# Patient Record
Sex: MALE | Race: BLACK OR AFRICAN AMERICAN | NOT HISPANIC OR LATINO | URBAN - METROPOLITAN AREA
[De-identification: names, ages, dates, MRNs, and addresses within clinical notes are randomized per-mention and may not be internally consistent; named-entity substitution may affect disease eponyms.]

---

## 2017-03-10 ENCOUNTER — OUTPATIENT (OUTPATIENT)
Dept: OUTPATIENT SERVICES | Facility: HOSPITAL | Age: 47
LOS: 1 days | Discharge: HOME | End: 2017-03-10

## 2017-06-27 DIAGNOSIS — E78.5 HYPERLIPIDEMIA, UNSPECIFIED: ICD-10-CM

## 2017-06-27 DIAGNOSIS — E11.9 TYPE 2 DIABETES MELLITUS WITHOUT COMPLICATIONS: ICD-10-CM

## 2017-10-05 ENCOUNTER — OUTPATIENT (OUTPATIENT)
Dept: OUTPATIENT SERVICES | Facility: HOSPITAL | Age: 47
LOS: 1 days | Discharge: HOME | End: 2017-10-05

## 2017-10-05 DIAGNOSIS — E11.9 TYPE 2 DIABETES MELLITUS WITHOUT COMPLICATIONS: ICD-10-CM

## 2018-06-08 ENCOUNTER — OUTPATIENT (OUTPATIENT)
Dept: OUTPATIENT SERVICES | Facility: HOSPITAL | Age: 48
LOS: 1 days | Discharge: HOME | End: 2018-06-08

## 2018-06-12 DIAGNOSIS — K02.53 DENTAL CARIES ON PIT AND FISSURE SURFACE PENETRATING INTO PULP: ICD-10-CM

## 2018-07-31 ENCOUNTER — OUTPATIENT (OUTPATIENT)
Dept: OUTPATIENT SERVICES | Facility: HOSPITAL | Age: 48
LOS: 1 days | Discharge: HOME | End: 2018-07-31

## 2018-07-31 DIAGNOSIS — E78.5 HYPERLIPIDEMIA, UNSPECIFIED: ICD-10-CM

## 2018-07-31 DIAGNOSIS — E11.9 TYPE 2 DIABETES MELLITUS WITHOUT COMPLICATIONS: ICD-10-CM

## 2020-12-21 ENCOUNTER — TRANSCRIPTION ENCOUNTER (OUTPATIENT)
Age: 50
End: 2020-12-21

## 2021-01-05 ENCOUNTER — APPOINTMENT (OUTPATIENT)
Dept: GASTROENTEROLOGY | Facility: CLINIC | Age: 51
End: 2021-01-05

## 2021-02-01 ENCOUNTER — APPOINTMENT (OUTPATIENT)
Dept: GASTROENTEROLOGY | Facility: CLINIC | Age: 51
End: 2021-02-01
Payer: COMMERCIAL

## 2021-02-01 DIAGNOSIS — Z78.9 OTHER SPECIFIED HEALTH STATUS: ICD-10-CM

## 2021-02-01 DIAGNOSIS — Z12.11 ENCOUNTER FOR SCREENING FOR MALIGNANT NEOPLASM OF COLON: ICD-10-CM

## 2021-02-01 PROCEDURE — 99203 OFFICE O/P NEW LOW 30 MIN: CPT | Mod: 95

## 2021-02-01 NOTE — HISTORY OF PRESENT ILLNESS
[Home] : at home, [unfilled] , at the time of the visit. [Verbal consent obtained from patient] : the patient, [unfilled] [Medical Office: ([facility] [place])___] : at the medical office located in  [[email or web address]] : Zhane Muhammad [de-identified] : This is a 50 year   old M referred for screening colonoscopy.  The patient denies nausea, vomiting , dysphagia, odynophagia, post prandial abdominal pain, or melena. The patient denies abdominal cramping, or black or bloody stool. He has never had a colonoscopy.

## 2021-03-08 ENCOUNTER — OUTPATIENT (OUTPATIENT)
Dept: OUTPATIENT SERVICES | Facility: HOSPITAL | Age: 51
LOS: 1 days | Discharge: HOME | End: 2021-03-08

## 2021-03-08 ENCOUNTER — LABORATORY RESULT (OUTPATIENT)
Age: 51
End: 2021-03-08

## 2021-03-08 DIAGNOSIS — Z11.59 ENCOUNTER FOR SCREENING FOR OTHER VIRAL DISEASES: ICD-10-CM

## 2021-04-26 ENCOUNTER — LABORATORY RESULT (OUTPATIENT)
Age: 51
End: 2021-04-26

## 2021-04-26 ENCOUNTER — OUTPATIENT (OUTPATIENT)
Dept: OUTPATIENT SERVICES | Facility: HOSPITAL | Age: 51
LOS: 1 days | Discharge: HOME | End: 2021-04-26

## 2021-04-26 DIAGNOSIS — Z11.59 ENCOUNTER FOR SCREENING FOR OTHER VIRAL DISEASES: ICD-10-CM

## 2021-04-29 ENCOUNTER — RESULT REVIEW (OUTPATIENT)
Age: 51
End: 2021-04-29

## 2021-04-29 ENCOUNTER — TRANSCRIPTION ENCOUNTER (OUTPATIENT)
Age: 51
End: 2021-04-29

## 2021-04-29 ENCOUNTER — OUTPATIENT (OUTPATIENT)
Dept: OUTPATIENT SERVICES | Facility: HOSPITAL | Age: 51
LOS: 1 days | Discharge: HOME | End: 2021-04-29
Payer: COMMERCIAL

## 2021-04-29 VITALS
TEMPERATURE: 98 F | HEIGHT: 76 IN | WEIGHT: 255.07 LBS | DIASTOLIC BLOOD PRESSURE: 91 MMHG | HEART RATE: 83 BPM | SYSTOLIC BLOOD PRESSURE: 150 MMHG | RESPIRATION RATE: 18 BRPM

## 2021-04-29 VITALS
DIASTOLIC BLOOD PRESSURE: 70 MMHG | SYSTOLIC BLOOD PRESSURE: 111 MMHG | RESPIRATION RATE: 17 BRPM | HEART RATE: 66 BPM | OXYGEN SATURATION: 100 %

## 2021-04-29 LAB — GLUCOSE BLDC GLUCOMTR-MCNC: 137 MG/DL — HIGH (ref 70–99)

## 2021-04-29 PROCEDURE — 88305 TISSUE EXAM BY PATHOLOGIST: CPT | Mod: 26

## 2021-04-29 PROCEDURE — 45380 COLONOSCOPY AND BIOPSY: CPT

## 2021-04-29 RX ORDER — METFORMIN HYDROCHLORIDE 850 MG/1
1 TABLET ORAL
Qty: 0 | Refills: 0 | DISCHARGE

## 2021-04-29 NOTE — CHART NOTE - NSCHARTNOTEFT_GEN_A_CORE
PACU ANESTHESIA ADMISSION NOTE      Procedure: colonoscopy  Post op diagnosis:      ____  Intubated  TV:______       Rate: ______      FiO2: ______    _x___  Patent Airway    _x___  Full return of protective reflexes    _x___  Full recovery from anesthesia / back to baseline status    Vitals  SPO2:-100%  HR:-84  RR:-12  B.P:-98/56  TEMP:-97.8    Mental Status:  _x___ Awake   ___x_ Alert   _____ Drowsy   _____ Sedated    Nausea/Vomiting:  _x___  NO       ______Yes,   See Post - Op Orders         Pain Scale (0-10):  __0___    Treatment: _x___ None    __x__ See Post - Op/PCA Orders    Post - Operative Fluids:   ___ Oral   ____x See Post - Op Orders    Plan: Discharge:   _x___Home       ____Floor     _____Critical Care    _____  Other:_________________    Comments:  Report endorsed to RN in pacu  Vitals stable  No anesthesia issues or complications noted.  Discharge to patient to  home when criteria met.

## 2021-04-29 NOTE — CHART NOTE - NSCHARTNOTEFT_GEN_A_CORE
PACU ANESTHESIA ADMISSION NOTE    procedure: colonoscopy  Post op diagnosis: colon screen     ____  Intubated  TV:______       Rate: ______      FiO2: ______    __x__  Patent Airway    __x__  Full return of protective reflexes    __x__  Full recovery from anesthesia / back to baseline status    Vitals:  T(C): 36.8 (04-29-21 @ 07:27), Max: 36.8 (04-29-21 @ 06:50)  HR: 83 (04-29-21 @ 07:27) (83 - 83)  BP: 150/91 (04-29-21 @ 07:27) (150/91 - 150/91)  RR: 18 (04-29-21 @ 07:27) (18 - 18)  SpO2: 99%    Mental Status:  __x__ Awake   _____ Alert   _____ Drowsy   _____ Sedated    Nausea/Vomiting:  __x__ NO  ______Yes,   See Post - Op Orders          Pain Scale (0-10):  ___0__    Treatment: __x__ None    ____ See Post - Op/PCA Orders    Post - Operative Fluids:   __x__ Oral   ____ See Post - Op Orders    Plan: Discharge:   __x__Home       _____Floor     _____Critical Care    _____  Other:_________________    Comments: uneventful anesthesia course no complications. VItals stable. Pt transferred to PACU

## 2021-04-29 NOTE — ASU PATIENT PROFILE, ADULT - AS SC BRADEN MOISTURE
Location #1: scalp
Consent: Written consent obtained, risks reviewed including but not limited to crusting, scabbing, blistering, scarring, darker or lighter pigmentary change, incidental hair removal, bruising, and/or incomplete removal.
Spot Size: 2 x 2 cm
Fluence Units: J/cm2
Fluence #2 (J/Cm2 Or Mj/Cm2): 300
(4) rarely moist
Mode: repeat paint
Detail Level: Detailed
Treatment Number: 3
Post-Care Instructions: I reviewed with the patient in detail post-care instructions. Patient should stay away from the sun and wear sun protection until treated areas are fully healed.
Location #2: lower legs
Total Square Area In Cm2 (Required For Proper Billing- Whole Numbers Only Please): 216

## 2021-04-30 LAB — SURGICAL PATHOLOGY STUDY: SIGNIFICANT CHANGE UP

## 2021-05-03 PROBLEM — E11.9 TYPE 2 DIABETES MELLITUS WITHOUT COMPLICATIONS: Chronic | Status: ACTIVE | Noted: 2021-04-29

## 2021-05-07 DIAGNOSIS — Z79.84 LONG TERM (CURRENT) USE OF ORAL HYPOGLYCEMIC DRUGS: ICD-10-CM

## 2021-05-07 DIAGNOSIS — D12.3 BENIGN NEOPLASM OF TRANSVERSE COLON: ICD-10-CM

## 2021-05-07 DIAGNOSIS — K64.8 OTHER HEMORRHOIDS: ICD-10-CM

## 2021-05-07 DIAGNOSIS — K64.4 RESIDUAL HEMORRHOIDAL SKIN TAGS: ICD-10-CM

## 2021-05-07 DIAGNOSIS — Z88.0 ALLERGY STATUS TO PENICILLIN: ICD-10-CM

## 2021-05-07 DIAGNOSIS — E11.9 TYPE 2 DIABETES MELLITUS WITHOUT COMPLICATIONS: ICD-10-CM

## 2021-05-07 DIAGNOSIS — Z12.11 ENCOUNTER FOR SCREENING FOR MALIGNANT NEOPLASM OF COLON: ICD-10-CM

## 2021-05-26 ENCOUNTER — APPOINTMENT (OUTPATIENT)
Dept: GASTROENTEROLOGY | Facility: CLINIC | Age: 51
End: 2021-05-26

## 2022-08-31 ENCOUNTER — APPOINTMENT (OUTPATIENT)
Dept: CARDIOLOGY | Facility: CLINIC | Age: 52
End: 2022-08-31

## 2022-08-31 DIAGNOSIS — U07.1 COVID-19: ICD-10-CM

## 2022-08-31 DIAGNOSIS — J30.9 ALLERGIC RHINITIS, UNSPECIFIED: ICD-10-CM

## 2023-02-17 ENCOUNTER — APPOINTMENT (OUTPATIENT)
Dept: ORTHOPEDIC SURGERY | Facility: CLINIC | Age: 53
End: 2023-02-17
Payer: COMMERCIAL

## 2023-02-17 VITALS
TEMPERATURE: 97.1 F | HEART RATE: 84 BPM | OXYGEN SATURATION: 98 % | WEIGHT: 250 LBS | SYSTOLIC BLOOD PRESSURE: 139 MMHG | HEIGHT: 76 IN | BODY MASS INDEX: 30.44 KG/M2 | DIASTOLIC BLOOD PRESSURE: 101 MMHG

## 2023-02-17 DIAGNOSIS — M25.521 PAIN IN RIGHT ELBOW: ICD-10-CM

## 2023-02-17 PROCEDURE — 73030 X-RAY EXAM OF SHOULDER: CPT | Mod: RT

## 2023-02-17 PROCEDURE — 73080 X-RAY EXAM OF ELBOW: CPT | Mod: RT

## 2023-02-17 PROCEDURE — 99203 OFFICE O/P NEW LOW 30 MIN: CPT
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
## 2023-02-17 NOTE — DISCUSSION/SUMMARY
[de-identified] : The patient has chronic right shoulder tendinitis with stiffness.  He has chronic triceps tendinitis.  I have discussed the pathology, natural history and treatment options with him.  He is referred for physical therapy to work on both problems.  He will be reevaluated in 6 weeks.

## 2023-02-17 NOTE — PHYSICAL EXAM
[Rad] : radial 2+ and symmetric bilaterally [Normal] : Alert and in no acute distress [Poor Appearance] : well-appearing [Acute Distress] : not in acute distress [Obese] : not obese [de-identified] : The patient has no respiratory distress. Mood and affect are normal. The patient is alert and oriented to person, place and time.\par Examination of the cervical spine demonstrates no tenderness, no deformity and no muscle spasm. Cervical spine rotation is 60° to the right, 60° to the left, 75° of extension and 45° of flexion. Neurologic exam of the upper extremities reveals intact sensation to light touch. Motor function is 5 over 5 in all groups. Deep tendon reflexes are 2+ and equal at the biceps, triceps and brachioradialis.\par Examination of the right shoulder demonstrates no deformity. The skin is intact. There is no erythema. There is tenderness anteriorly. Impingement sign is positive There is no instability. Drop arm test is negative. Empty can test is negative. Liftoff test is negative. [place] test is negative. \par He has elevation of 140 compared to 150 on the left.  He has external rotation of 45 degrees compared to 60 on the left.  He has internal rotation to the mid lumbar level compared to lower thoracic level on the left.  The elbows are stable.  There is mild tenderness at the triceps insertion and pain with triceps motion against resistance.  There is no lymphedema. [de-identified] : AP, transscapular and axillary x-rays of the right shoulder taken today demonstrate no fracture, no dislocation and no bony abnormality.\par AP, lateral and oblique x-rays of the right elbow demonstrate no fracture or dislocation.  There are calcifications at the triceps tendon insertion. Bilobed Transposition Flap Text: The defect edges were debeveled with a #15 scalpel blade.  Given the location of the defect and the proximity to free margins a bilobed transposition flap was deemed most appropriate.  Using a sterile surgical marker, an appropriate bilobe flap drawn around the defect.    The area thus outlined was incised deep to adipose tissue with a #15 scalpel blade.  The skin margins were undermined to an appropriate distance in all directions utilizing iris scissors.

## 2023-03-31 ENCOUNTER — APPOINTMENT (OUTPATIENT)
Dept: ORTHOPEDIC SURGERY | Facility: CLINIC | Age: 53
End: 2023-03-31
Payer: COMMERCIAL

## 2023-03-31 VITALS
DIASTOLIC BLOOD PRESSURE: 86 MMHG | HEART RATE: 87 BPM | BODY MASS INDEX: 30.44 KG/M2 | HEIGHT: 76 IN | OXYGEN SATURATION: 98 % | SYSTOLIC BLOOD PRESSURE: 141 MMHG | TEMPERATURE: 97 F | WEIGHT: 250 LBS

## 2023-03-31 PROCEDURE — 99213 OFFICE O/P EST LOW 20 MIN: CPT

## 2023-03-31 NOTE — HISTORY OF PRESENT ILLNESS
[de-identified] : The patient presents for reevaluation of right shoulder tendinitis. He has had great improvement with physical therapy which he has been attending 2 x per week. He still has some pain with internal rotation. He has not felt the need to take any medication for the pain. He would like to continue his course of PT.

## 2023-03-31 NOTE — DISCUSSION/SUMMARY
[de-identified] : The patient's right shoulder is improving.  He still has some stiffness with internal rotation.  I recommend he continue his physical therapy program.  He will return in 1 month.

## 2023-04-27 ENCOUNTER — NON-APPOINTMENT (OUTPATIENT)
Age: 53
End: 2023-04-27

## 2023-04-28 ENCOUNTER — APPOINTMENT (OUTPATIENT)
Dept: ORTHOPEDIC SURGERY | Facility: CLINIC | Age: 53
End: 2023-04-28
Payer: COMMERCIAL

## 2023-04-28 VITALS
DIASTOLIC BLOOD PRESSURE: 87 MMHG | BODY MASS INDEX: 30.44 KG/M2 | OXYGEN SATURATION: 98 % | WEIGHT: 250 LBS | SYSTOLIC BLOOD PRESSURE: 130 MMHG | HEIGHT: 76 IN | HEART RATE: 76 BPM | TEMPERATURE: 97.3 F

## 2023-04-28 DIAGNOSIS — M25.511 PAIN IN RIGHT SHOULDER: ICD-10-CM

## 2023-04-28 PROCEDURE — 99213 OFFICE O/P EST LOW 20 MIN: CPT

## 2023-04-28 NOTE — HISTORY OF PRESENT ILLNESS
[de-identified] : The patient presents for reevaluation of right shoulder pain. He has continued to do PT 2 x per week with no improvement since his last visit. He feels that his shoulder will not be able to fully improve with PT. He is not taking any medications currently.

## 2023-04-28 NOTE — PHYSICAL EXAM
[Rad] : radial 2+ and symmetric bilaterally [Normal] : Alert and in no acute distress [Poor Appearance] : well-appearing [Acute Distress] : not in acute distress [Obese] : not obese [de-identified] : The patient has no respiratory distress. Mood and affect are normal. The patient is alert and oriented to person, place and time.\par Examination of the cervical spine demonstrates no tenderness, no deformity and no muscle spasm. Cervical spine rotation is 60° to the right, 60° to the left, 75° of extension and 45° of flexion. Neurologic exam of the upper extremities reveals intact sensation to light touch. Motor function is 5 over 5 in all groups. Deep tendon reflexes are 2+ and equal at the biceps, triceps and brachioradialis.\par Examination of the right shoulder demonstrates no deformity. The skin is intact. There is no erythema. There is tenderness anteriorly. Impingement sign is positive There is no instability. Drop arm test is negative. Empty can test is negative. Liftoff test is negative. [place] test is negative. \par He has elevation of 140 compared to 150 on the left.  He has external rotation of 45 degrees compared to 60 on the left.  He has internal rotation to the mid lumbar level compared to lower thoracic level on the left.  The elbows are stable.  There is mild tenderness at the triceps insertion and pain with triceps motion against resistance.  There is no lymphedema.

## 2023-04-28 NOTE — DISCUSSION/SUMMARY
[de-identified] : The patient has persistent pain in his right shoulder despite 2 months of physical therapy.  He will be sent for an MRI for further evaluation of his rotator cuff.  He will be reevaluated following the MRI.

## 2023-05-14 NOTE — ASU PREOP CHECKLIST - BMI (KG/M2)
Follow-up with orthopedic office on Monday.  Call for appointment.  Keep knee brace on.  Use crutches for nonweightbearing on affected side.  May apply ice 20 minutes at a time.  Take ibuprofen as directed on bottle for pain may alternate with Tylenol.   31

## 2023-07-16 ENCOUNTER — EMERGENCY (EMERGENCY)
Facility: HOSPITAL | Age: 53
LOS: 0 days | Discharge: HOME | End: 2023-07-16
Attending: STUDENT IN AN ORGANIZED HEALTH CARE EDUCATION/TRAINING PROGRAM
Payer: COMMERCIAL

## 2023-07-16 VITALS
TEMPERATURE: 99 F | HEART RATE: 93 BPM | OXYGEN SATURATION: 100 % | RESPIRATION RATE: 18 BRPM | SYSTOLIC BLOOD PRESSURE: 141 MMHG | WEIGHT: 250 LBS | DIASTOLIC BLOOD PRESSURE: 86 MMHG | HEIGHT: 76 IN

## 2023-07-16 VITALS
HEART RATE: 83 BPM | DIASTOLIC BLOOD PRESSURE: 97 MMHG | SYSTOLIC BLOOD PRESSURE: 163 MMHG | TEMPERATURE: 99 F | RESPIRATION RATE: 20 BRPM | OXYGEN SATURATION: 98 %

## 2023-07-16 DIAGNOSIS — Z79.84 LONG TERM (CURRENT) USE OF ORAL HYPOGLYCEMIC DRUGS: ICD-10-CM

## 2023-07-16 DIAGNOSIS — K82.8 OTHER SPECIFIED DISEASES OF GALLBLADDER: ICD-10-CM

## 2023-07-16 DIAGNOSIS — Q44.6 CYSTIC DISEASE OF LIVER: ICD-10-CM

## 2023-07-16 DIAGNOSIS — I10 ESSENTIAL (PRIMARY) HYPERTENSION: ICD-10-CM

## 2023-07-16 DIAGNOSIS — N13.2 HYDRONEPHROSIS WITH RENAL AND URETERAL CALCULOUS OBSTRUCTION: ICD-10-CM

## 2023-07-16 DIAGNOSIS — R10.12 LEFT UPPER QUADRANT PAIN: ICD-10-CM

## 2023-07-16 DIAGNOSIS — Z88.0 ALLERGY STATUS TO PENICILLIN: ICD-10-CM

## 2023-07-16 DIAGNOSIS — E78.5 HYPERLIPIDEMIA, UNSPECIFIED: ICD-10-CM

## 2023-07-16 DIAGNOSIS — E11.9 TYPE 2 DIABETES MELLITUS WITHOUT COMPLICATIONS: ICD-10-CM

## 2023-07-16 DIAGNOSIS — Q61.3 POLYCYSTIC KIDNEY, UNSPECIFIED: ICD-10-CM

## 2023-07-16 LAB
ALBUMIN SERPL ELPH-MCNC: 4.5 G/DL — SIGNIFICANT CHANGE UP (ref 3.3–5)
ALP SERPL-CCNC: 76 U/L — SIGNIFICANT CHANGE UP (ref 40–120)
ALT FLD-CCNC: 32 U/L — SIGNIFICANT CHANGE UP (ref 12–78)
ANION GAP SERPL CALC-SCNC: 4 MMOL/L — LOW (ref 5–17)
APPEARANCE UR: CLEAR — SIGNIFICANT CHANGE UP
AST SERPL-CCNC: 24 U/L — SIGNIFICANT CHANGE UP (ref 15–37)
BACTERIA # UR AUTO: ABNORMAL
BASOPHILS # BLD AUTO: 0.02 K/UL — SIGNIFICANT CHANGE UP (ref 0–0.2)
BASOPHILS NFR BLD AUTO: 0.2 % — SIGNIFICANT CHANGE UP (ref 0–2)
BILIRUB SERPL-MCNC: 0.7 MG/DL — SIGNIFICANT CHANGE UP (ref 0.2–1.2)
BILIRUB UR-MCNC: NEGATIVE — SIGNIFICANT CHANGE UP
BUN SERPL-MCNC: 26 MG/DL — HIGH (ref 7–23)
CALCIUM SERPL-MCNC: 9.3 MG/DL — SIGNIFICANT CHANGE UP (ref 8.5–10.1)
CHLORIDE SERPL-SCNC: 107 MMOL/L — SIGNIFICANT CHANGE UP (ref 96–108)
CO2 SERPL-SCNC: 26 MMOL/L — SIGNIFICANT CHANGE UP (ref 22–31)
COLOR SPEC: YELLOW — SIGNIFICANT CHANGE UP
CREAT SERPL-MCNC: 2.59 MG/DL — HIGH (ref 0.5–1.3)
DIFF PNL FLD: ABNORMAL
EGFR: 29 ML/MIN/1.73M2 — LOW
EOSINOPHIL # BLD AUTO: 0.08 K/UL — SIGNIFICANT CHANGE UP (ref 0–0.5)
EOSINOPHIL NFR BLD AUTO: 0.9 % — SIGNIFICANT CHANGE UP (ref 0–6)
GLUCOSE SERPL-MCNC: 158 MG/DL — HIGH (ref 70–99)
GLUCOSE UR QL: NEGATIVE MG/DL — SIGNIFICANT CHANGE UP
HCT VFR BLD CALC: 45.4 % — SIGNIFICANT CHANGE UP (ref 39–50)
HGB BLD-MCNC: 15.2 G/DL — SIGNIFICANT CHANGE UP (ref 13–17)
IMM GRANULOCYTES NFR BLD AUTO: 0.4 % — SIGNIFICANT CHANGE UP (ref 0–0.9)
KETONES UR-MCNC: NEGATIVE — SIGNIFICANT CHANGE UP
LEUKOCYTE ESTERASE UR-ACNC: NEGATIVE — SIGNIFICANT CHANGE UP
LIDOCAIN IGE QN: 111 U/L — SIGNIFICANT CHANGE UP (ref 73–393)
LYMPHOCYTES # BLD AUTO: 1.46 K/UL — SIGNIFICANT CHANGE UP (ref 1–3.3)
LYMPHOCYTES # BLD AUTO: 16.4 % — SIGNIFICANT CHANGE UP (ref 13–44)
MCHC RBC-ENTMCNC: 28.2 PG — SIGNIFICANT CHANGE UP (ref 27–34)
MCHC RBC-ENTMCNC: 33.5 G/DL — SIGNIFICANT CHANGE UP (ref 32–36)
MCV RBC AUTO: 84.2 FL — SIGNIFICANT CHANGE UP (ref 80–100)
MONOCYTES # BLD AUTO: 0.88 K/UL — SIGNIFICANT CHANGE UP (ref 0–0.9)
MONOCYTES NFR BLD AUTO: 9.9 % — SIGNIFICANT CHANGE UP (ref 2–14)
NEUTROPHILS # BLD AUTO: 6.41 K/UL — SIGNIFICANT CHANGE UP (ref 1.8–7.4)
NEUTROPHILS NFR BLD AUTO: 72.2 % — SIGNIFICANT CHANGE UP (ref 43–77)
NITRITE UR-MCNC: NEGATIVE — SIGNIFICANT CHANGE UP
NRBC # BLD: 0 /100 WBCS — SIGNIFICANT CHANGE UP (ref 0–0)
PH UR: 5 — SIGNIFICANT CHANGE UP (ref 5–8)
PLATELET # BLD AUTO: 236 K/UL — SIGNIFICANT CHANGE UP (ref 150–400)
POTASSIUM SERPL-MCNC: 4.5 MMOL/L — SIGNIFICANT CHANGE UP (ref 3.5–5.3)
POTASSIUM SERPL-SCNC: 4.5 MMOL/L — SIGNIFICANT CHANGE UP (ref 3.5–5.3)
PROT SERPL-MCNC: 8.8 GM/DL — HIGH (ref 6–8.3)
PROT UR-MCNC: 30 MG/DL
RBC # BLD: 5.39 M/UL — SIGNIFICANT CHANGE UP (ref 4.2–5.8)
RBC # FLD: 12.5 % — SIGNIFICANT CHANGE UP (ref 10.3–14.5)
RBC CASTS # UR COMP ASSIST: ABNORMAL /HPF (ref 0–4)
SODIUM SERPL-SCNC: 137 MMOL/L — SIGNIFICANT CHANGE UP (ref 135–145)
SP GR SPEC: 1.02 — SIGNIFICANT CHANGE UP (ref 1.01–1.02)
UROBILINOGEN FLD QL: NEGATIVE MG/DL — SIGNIFICANT CHANGE UP
WBC # BLD: 8.89 K/UL — SIGNIFICANT CHANGE UP (ref 3.8–10.5)
WBC # FLD AUTO: 8.89 K/UL — SIGNIFICANT CHANGE UP (ref 3.8–10.5)
WBC UR QL: SIGNIFICANT CHANGE UP

## 2023-07-16 PROCEDURE — 74176 CT ABD & PELVIS W/O CONTRAST: CPT | Mod: 26,MA

## 2023-07-16 PROCEDURE — 99285 EMERGENCY DEPT VISIT HI MDM: CPT

## 2023-07-16 RX ORDER — TAMSULOSIN HYDROCHLORIDE 0.4 MG/1
1 CAPSULE ORAL
Qty: 14 | Refills: 0
Start: 2023-07-16 | End: 2023-07-29

## 2023-07-16 RX ORDER — ONDANSETRON 8 MG/1
4 TABLET, FILM COATED ORAL ONCE
Refills: 0 | Status: COMPLETED | OUTPATIENT
Start: 2023-07-16 | End: 2023-07-16

## 2023-07-16 RX ORDER — SODIUM CHLORIDE 9 MG/ML
1000 INJECTION INTRAMUSCULAR; INTRAVENOUS; SUBCUTANEOUS ONCE
Refills: 0 | Status: COMPLETED | OUTPATIENT
Start: 2023-07-16 | End: 2023-07-16

## 2023-07-16 RX ORDER — FAMOTIDINE 10 MG/ML
20 INJECTION INTRAVENOUS ONCE
Refills: 0 | Status: COMPLETED | OUTPATIENT
Start: 2023-07-16 | End: 2023-07-16

## 2023-07-16 RX ORDER — OXYCODONE HYDROCHLORIDE 5 MG/1
1 TABLET ORAL
Qty: 5 | Refills: 0
Start: 2023-07-16

## 2023-07-16 RX ORDER — ONDANSETRON 8 MG/1
1 TABLET, FILM COATED ORAL
Qty: 1 | Refills: 0
Start: 2023-07-16

## 2023-07-16 RX ADMIN — SODIUM CHLORIDE 1000 MILLILITER(S): 9 INJECTION INTRAMUSCULAR; INTRAVENOUS; SUBCUTANEOUS at 16:32

## 2023-07-16 RX ADMIN — FAMOTIDINE 20 MILLIGRAM(S): 10 INJECTION INTRAVENOUS at 16:34

## 2023-07-16 RX ADMIN — Medication 30 MILLILITER(S): at 16:33

## 2023-07-16 RX ADMIN — ONDANSETRON 4 MILLIGRAM(S): 8 TABLET, FILM COATED ORAL at 16:34

## 2023-07-16 NOTE — ED ADULT NURSE NOTE - OBJECTIVE STATEMENT
53 male, A&Ox4 patient presents to ED c/o LUQ since yesterday, sent from  for further eval. Patient reports abdomen distended and tenderness to touch Currently, patient rates pain at 7/10, Denies vomiting/diarrhea. There is no associated chest pain, SOB, or back/flank pain. A Normoactive bowel sounds, denies any recent illness, change in diet, foreign travel, or sick contacts.  PMH: DM, HTN, HDL

## 2023-07-16 NOTE — CONSULT NOTE ADULT - ASSESSMENT
53 year old male PMHx HTN, DM, presenting with left flank pain, found to have 2-3 mm left hydroureteronephrosis, Cr elevated 2.6, previously 1.4    - recommend flomax and increased PO hydration  - discharge home with urine strainer, pain control  - discussed with Dr. Randolph

## 2023-07-16 NOTE — ED PROVIDER NOTE - PATIENT PORTAL LINK FT
You can access the FollowMyHealth Patient Portal offered by [facility] by registering at the following website: [email or web address]. By joining [facility]’s FollowMyHealth portal, you will also be able to view your health information using other applications (apps) compatible with our system.

## 2023-07-16 NOTE — ED PROVIDER NOTE - GASTROINTESTINAL [+], MLM
The heart function on the left side looks a little better. Your right side is still dilated, but overall stable.     We think you are a little bit on the dry side. This may contribute to you feeling dizziness. The heat makes this more challenging and may make you feel worse. We don't recommend that you sit outside when it is hot out, and sweat a lot.     Your labs look good today, so even though you are a little dry, your kidneys are not being affected right now.     You can drink a little more water.    ABDOMINAL PAIN

## 2023-07-16 NOTE — CONSULT NOTE ADULT - SUBJECTIVE AND OBJECTIVE BOX
Chief Complaint:  Patient is a 53y old  Male who presents with a chief complaint of     HPI:  53 year old male PMHx HTN and DM presenting with left flank pain x1 day. Patient reports pain began suddenly and has persisted today, which prompted his visit. In ED, CT reveals 2-3mm left ureteral stone with mild left hydroureteronephrosis and obstructive uropathy. Creatinine elevated 2.59 today, baseline 1.4.     PMH/PSH:PAST MEDICAL & SURGICAL HISTORY:  DM (diabetes mellitus)  No significant past surgical history    Allergies:  penicillins (Rash)    Medications:      REVIEW OF SYSTEMS:  All other review of systems is negative unless indicated above.    Relevant Family History:   FAMILY HISTORY:  Relevant Social History:  Denies ETOH or tobacco history    Physical Exam:    Vital Signs:  Vital Signs Last 24 Hrs  T(C): 37.2 (2023 14:56), Max: 37.2 (2023 14:56)  T(F): 98.9 (2023 14:56), Max: 98.9 (2023 14:56)  HR: 93 (2023 14:56) (93 - 93)  BP: 141/86 (2023 14:56) (141/86 - 141/86)  BP(mean): --  RR: 18 (2023 14:56) (18 - 18)  SpO2: 100% (2023 14:56) (100% - 100%)    Parameters below as of 2023 14:56  Patient On (Oxygen Delivery Method): room air      Daily Height in cm: 193.04 (2023 14:56)    Daily     Constitutional: NAD  HEENT: NC/AT, PERRL, EOMI  Respiratory: respirations nonlabored on room air  : +left CVA tenderness, no right CVA tenderness   Extremities: no clubbing or cyanosis; no peripheral edema  Musculoskeletal:  no joint swelling, tenderness or erythema  Skin: warm, dry and intact  Neurologic: AAOx3    Laboratory:                          15.2   8.89  )-----------( 236      ( 2023 16:30 )             45.4     07-16    137  |  107  |  26<H>  ----------------------------<  158<H>  4.5   |  26  |  2.59<H>    Ca    9.3      2023 16:30    TPro  8.8<H>  /  Alb  4.5  /  TBili  0.7  /  DBili  x   /  AST  24  /  ALT  32  /  AlkPhos  76  07-16    LIVER FUNCTIONS - ( 2023 16:30 )  Alb: 4.5 g/dL / Pro: 8.8 gm/dL / ALK PHOS: 76 U/L / ALT: 32 U/L / AST: 24 U/L / GGT: x           Urinalysis Basic - ( 2023 16:30 )    Color: Yellow / Appearance: Clear / S.020 / pH: x  Gluc: 158 mg/dL / Ketone: Negative  / Bili: Negative / Urobili: Negative mg/dL   Blood: x / Protein: 30 mg/dL / Nitrite: Negative   Leuk Esterase: Negative / RBC: 6-10 /HPF / WBC 0-2   Sq Epi: x / Non Sq Epi: x / Bacteria: Occasional    Amylase Serum--     Lipase [id number] U/L       Ammonia--    Intake and Output    Imaging:    < from: CT Abdomen and Pelvis No Cont (23 @ 18:02) >    ACC: [id number] EXAM:  CT ABDOMEN AND PELVIS   ORDERED BY: SANA IRVING     PROCEDURE DATE:  2023          INTERPRETATION:  CLINICAL INFORMATION: 53 years  Male with L flank pain,   tico. Polycystic kidney disease    COMPARISON: None.    CONTRAST/COMPLICATIONS:  IV Contrast: NONE  Oral Contrast: NONE  Complications: None reported at time of study completion    PROCEDURE:  CT of the Abdomen and Pelvis was performed.  Sagittal and coronal reformats were performed.    FINDINGS:  LOWER CHEST: Within normal limits.    LIVER: Innumerable cysts and hypodensities too small to characterize.  BILE DUCTS: Normal caliber.  GALLBLADDER: Sludge versus stones. No pericholecystic edema.  SPLEEN: Within normal limits.  PANCREAS: Within normal limits.  ADRENALS: Within normal limits.  KIDNEYS/URETERS: Multiple cysts and hypodensities too small to   characterize. Several hyperdensities as well. Left perinephric edema.   Mild left hydroureter ureteronephrosis to the level of a 2 to 3 mm distal   ureteral calculus (2:144). Nonobstructing 4 mm right lower pole   intrarenal calculus.    BLADDER: Within normal limits.  REPRODUCTIVE ORGANS: Prostate within normal limits.    BOWEL: No bowel obstruction. Appendix is normal.  PERITONEUM: No ascites.  VESSELS: Within normal limits.  RETROPERITONEUM/LYMPH NODES: No lymphadenopathy.  ABDOMINAL WALL: Within normal limits.  BONES: Mild degenerative changes.    IMPRESSION:  2 to 3 mm left distal ureteral calculus with mild left   hydroureteronephrosis and obstructive uropathy.    Gallbladder sludge versus stones.    Polycystic kidneys. Several indeterminate hyperdensities.    Polycystic liver.    Evaluation for solid liver and renal masses is limited without IV   contrast.      --- End of Report ---      GOLDEN HENNESSY MD; Attending Radiologist  This document has been electronically signed. 2023  6:14PM    < end of copied text >

## 2023-07-16 NOTE — ED PROVIDER NOTE - CARE PROVIDER_API CALL
Justin Randolph  Urology  [address]  [place] [address]  Phone: [phone]  Fax: [phone]  Follow Up Time:

## 2023-07-16 NOTE — ED PROVIDER NOTE - OBJECTIVE STATEMENT
54yo male with pmh polycystic kidney dz, htn, hld, dm, presenting with L flank and LUQ abd pain.  Started earlier today.  Never had this before.  No fever, vomiting, diarrhea, constipation.  No pshx.  No cp, sob.

## 2023-07-16 NOTE — ED ADULT TRIAGE NOTE - CHIEF COMPLAINT QUOTE
LUQ pain since yesterday, sent ot [facility] for eval. pt abdomen distended and tenderness to touch. DM, HTN, HDL

## 2023-07-16 NOTE — ED PROVIDER NOTE - PROGRESS NOTE DETAILS
Murray: Cr elevated and blood in urine, CT perofrmed shows stone amongst other nonemergent findings.  Reviewed with patient and answered questions.  Pain is controlled.  Making urine.  Instructed on return precautions and plan for follow up.  Answered questions, will dc.

## 2023-07-16 NOTE — ED PROVIDER NOTE - CLINICAL SUMMARY MEDICAL DECISION MAKING FREE TEXT BOX
L flank and luq pain starting earlier today with some ttp.  Nontoxic and comfortable appearing here.  States pain constant.  No fevers, urinary symptoms.  Lower suspicion gu, stone, infection.  Possible gastritis, lower suspicion pancreatitis, acute surgical or intraabdominal pathology.  Plan ofr labs, meds, reassess.  Further work up as indicated pending results and reassessment.

## 2023-07-16 NOTE — ED PROVIDER NOTE - NSICDXNOPASTSURGICALHX_GEN_ALL_ED
Medication has been sent.   Other parts of the encounter will be addressed at patient office visit with Dr. Elise Quiroz on 12/21/2020 <-- Click to add NO significant Past Surgical History

## 2023-07-16 NOTE — ED PROVIDER NOTE - PHYSICAL EXAMINATION
General appearance: Nontoxic appearing, conversant, afebrile    Eyes: anicteric sclerae, JUANITA, EOMI   HENT: Atraumatic; oropharynx clear, MMM and no ulcerations, no pharyngeal erythema or exudate   Neck: Trachea midline; Full range of motion, supple   Pulm: CTA bl, normal respiratory effort and no intercostal retractions, normal work of breathing   CV: RRR, No murmurs, rubs, or gallops   Abdomen: Soft, LUQ tender, non-distended; no guarding or rebound   Back: No midline ttp, step offs, deformities, no cvat    Extremities: No peripheral edema, no gross deformities, FROM x4   Skin: Dry, normal temperature, turgor and texture; no rash   Psych: Appropriate affect, cooperative

## 2023-07-16 NOTE — ED PROVIDER NOTE - NSFOLLOWUPINSTRUCTIONS_ED_ALL_ED_FT
Rest, drink plenty of fluids  Advance activity as tolerated  Continue all previously prescribed medications as directed  Follow up with your PMD - bring copies of your results  Return to the ER for severe pain, vomiting, fevers, or other new or concerning symptoms   For pain, you may take Tylenol 975mg every six hours as needed  Take oxycodone every 8 hours for severe pain  Take flomax daily  Follow up with urology

## 2023-07-19 ENCOUNTER — EMERGENCY (EMERGENCY)
Facility: HOSPITAL | Age: 53
LOS: 0 days | Discharge: HOME | End: 2023-07-20
Attending: STUDENT IN AN ORGANIZED HEALTH CARE EDUCATION/TRAINING PROGRAM
Payer: COMMERCIAL

## 2023-07-19 VITALS
HEIGHT: 76 IN | DIASTOLIC BLOOD PRESSURE: 99 MMHG | OXYGEN SATURATION: 97 % | WEIGHT: 250 LBS | RESPIRATION RATE: 16 BRPM | TEMPERATURE: 99 F | HEART RATE: 95 BPM | SYSTOLIC BLOOD PRESSURE: 152 MMHG

## 2023-07-19 VITALS
DIASTOLIC BLOOD PRESSURE: 94 MMHG | RESPIRATION RATE: 16 BRPM | OXYGEN SATURATION: 98 % | TEMPERATURE: 99 F | HEART RATE: 83 BPM | SYSTOLIC BLOOD PRESSURE: 148 MMHG

## 2023-07-19 DIAGNOSIS — E78.5 HYPERLIPIDEMIA, UNSPECIFIED: ICD-10-CM

## 2023-07-19 DIAGNOSIS — I10 ESSENTIAL (PRIMARY) HYPERTENSION: ICD-10-CM

## 2023-07-19 DIAGNOSIS — Z88.0 ALLERGY STATUS TO PENICILLIN: ICD-10-CM

## 2023-07-19 DIAGNOSIS — K59.00 CONSTIPATION, UNSPECIFIED: ICD-10-CM

## 2023-07-19 DIAGNOSIS — Z87.442 PERSONAL HISTORY OF URINARY CALCULI: ICD-10-CM

## 2023-07-19 DIAGNOSIS — Z79.84 LONG TERM (CURRENT) USE OF ORAL HYPOGLYCEMIC DRUGS: ICD-10-CM

## 2023-07-19 DIAGNOSIS — Z87.448 PERSONAL HISTORY OF OTHER DISEASES OF URINARY SYSTEM: ICD-10-CM

## 2023-07-19 DIAGNOSIS — N23 UNSPECIFIED RENAL COLIC: ICD-10-CM

## 2023-07-19 DIAGNOSIS — E11.9 TYPE 2 DIABETES MELLITUS WITHOUT COMPLICATIONS: ICD-10-CM

## 2023-07-19 DIAGNOSIS — R10.32 LEFT LOWER QUADRANT PAIN: ICD-10-CM

## 2023-07-19 LAB
ALBUMIN SERPL ELPH-MCNC: 3.6 G/DL — SIGNIFICANT CHANGE UP (ref 3.3–5)
ALP SERPL-CCNC: 67 U/L — SIGNIFICANT CHANGE UP (ref 40–120)
ALT FLD-CCNC: 30 U/L — SIGNIFICANT CHANGE UP (ref 12–78)
ANION GAP SERPL CALC-SCNC: 2 MMOL/L — LOW (ref 5–17)
APPEARANCE UR: CLEAR — SIGNIFICANT CHANGE UP
AST SERPL-CCNC: 27 U/L — SIGNIFICANT CHANGE UP (ref 15–37)
BACTERIA # UR AUTO: ABNORMAL
BASOPHILS # BLD AUTO: 0.03 K/UL — SIGNIFICANT CHANGE UP (ref 0–0.2)
BASOPHILS NFR BLD AUTO: 0.4 % — SIGNIFICANT CHANGE UP (ref 0–2)
BILIRUB SERPL-MCNC: 0.4 MG/DL — SIGNIFICANT CHANGE UP (ref 0.2–1.2)
BILIRUB UR-MCNC: NEGATIVE — SIGNIFICANT CHANGE UP
BUN SERPL-MCNC: 25 MG/DL — HIGH (ref 7–23)
CALCIUM SERPL-MCNC: 8.6 MG/DL — SIGNIFICANT CHANGE UP (ref 8.5–10.1)
CHLORIDE SERPL-SCNC: 107 MMOL/L — SIGNIFICANT CHANGE UP (ref 96–108)
CO2 SERPL-SCNC: 25 MMOL/L — SIGNIFICANT CHANGE UP (ref 22–31)
COLOR SPEC: YELLOW — SIGNIFICANT CHANGE UP
CREAT SERPL-MCNC: 2.63 MG/DL — HIGH (ref 0.5–1.3)
DIFF PNL FLD: ABNORMAL
EGFR: 28 ML/MIN/1.73M2 — LOW
EOSINOPHIL # BLD AUTO: 0.16 K/UL — SIGNIFICANT CHANGE UP (ref 0–0.5)
EOSINOPHIL NFR BLD AUTO: 2.1 % — SIGNIFICANT CHANGE UP (ref 0–6)
EPI CELLS # UR: NEGATIVE — SIGNIFICANT CHANGE UP
GLUCOSE SERPL-MCNC: 176 MG/DL — HIGH (ref 70–99)
GLUCOSE UR QL: NEGATIVE MG/DL — SIGNIFICANT CHANGE UP
HCT VFR BLD CALC: 42.4 % — SIGNIFICANT CHANGE UP (ref 39–50)
HGB BLD-MCNC: 14 G/DL — SIGNIFICANT CHANGE UP (ref 13–17)
IMM GRANULOCYTES NFR BLD AUTO: 0.3 % — SIGNIFICANT CHANGE UP (ref 0–0.9)
KETONES UR-MCNC: NEGATIVE — SIGNIFICANT CHANGE UP
LEUKOCYTE ESTERASE UR-ACNC: ABNORMAL
LYMPHOCYTES # BLD AUTO: 1.01 K/UL — SIGNIFICANT CHANGE UP (ref 1–3.3)
LYMPHOCYTES # BLD AUTO: 13.2 % — SIGNIFICANT CHANGE UP (ref 13–44)
MCHC RBC-ENTMCNC: 28.1 PG — SIGNIFICANT CHANGE UP (ref 27–34)
MCHC RBC-ENTMCNC: 33 G/DL — SIGNIFICANT CHANGE UP (ref 32–36)
MCV RBC AUTO: 85 FL — SIGNIFICANT CHANGE UP (ref 80–100)
MONOCYTES # BLD AUTO: 1 K/UL — HIGH (ref 0–0.9)
MONOCYTES NFR BLD AUTO: 13.1 % — SIGNIFICANT CHANGE UP (ref 2–14)
NEUTROPHILS # BLD AUTO: 5.43 K/UL — SIGNIFICANT CHANGE UP (ref 1.8–7.4)
NEUTROPHILS NFR BLD AUTO: 70.9 % — SIGNIFICANT CHANGE UP (ref 43–77)
NITRITE UR-MCNC: NEGATIVE — SIGNIFICANT CHANGE UP
NRBC # BLD: 0 /100 WBCS — SIGNIFICANT CHANGE UP (ref 0–0)
PH UR: 5 — SIGNIFICANT CHANGE UP (ref 5–8)
PLATELET # BLD AUTO: 223 K/UL — SIGNIFICANT CHANGE UP (ref 150–400)
POTASSIUM SERPL-MCNC: 5.1 MMOL/L — SIGNIFICANT CHANGE UP (ref 3.5–5.3)
POTASSIUM SERPL-SCNC: 5.1 MMOL/L — SIGNIFICANT CHANGE UP (ref 3.5–5.3)
PROT SERPL-MCNC: 8 GM/DL — SIGNIFICANT CHANGE UP (ref 6–8.3)
PROT UR-MCNC: 100 MG/DL
RBC # BLD: 4.99 M/UL — SIGNIFICANT CHANGE UP (ref 4.2–5.8)
RBC # FLD: 12.2 % — SIGNIFICANT CHANGE UP (ref 10.3–14.5)
RBC CASTS # UR COMP ASSIST: ABNORMAL /HPF (ref 0–4)
SODIUM SERPL-SCNC: 134 MMOL/L — LOW (ref 135–145)
SP GR SPEC: 1.02 — SIGNIFICANT CHANGE UP (ref 1.01–1.02)
UROBILINOGEN FLD QL: NEGATIVE MG/DL — SIGNIFICANT CHANGE UP
WBC # BLD: 7.65 K/UL — SIGNIFICANT CHANGE UP (ref 3.8–10.5)
WBC # FLD AUTO: 7.65 K/UL — SIGNIFICANT CHANGE UP (ref 3.8–10.5)
WBC UR QL: ABNORMAL

## 2023-07-19 PROCEDURE — 99284 EMERGENCY DEPT VISIT MOD MDM: CPT

## 2023-07-19 RX ORDER — ACETAMINOPHEN 500 MG
975 TABLET ORAL ONCE
Refills: 0 | Status: COMPLETED | OUTPATIENT
Start: 2023-07-19 | End: 2023-07-19

## 2023-07-19 RX ORDER — KETOROLAC TROMETHAMINE 30 MG/ML
15 SYRINGE (ML) INJECTION ONCE
Refills: 0 | Status: DISCONTINUED | OUTPATIENT
Start: 2023-07-19 | End: 2023-07-19

## 2023-07-19 RX ORDER — SODIUM CHLORIDE 9 MG/ML
1000 INJECTION INTRAMUSCULAR; INTRAVENOUS; SUBCUTANEOUS ONCE
Refills: 0 | Status: COMPLETED | OUTPATIENT
Start: 2023-07-19 | End: 2023-07-19

## 2023-07-19 RX ADMIN — Medication 15 MILLIGRAM(S): at 23:02

## 2023-07-19 RX ADMIN — SODIUM CHLORIDE 1000 MILLILITER(S): 9 INJECTION INTRAMUSCULAR; INTRAVENOUS; SUBCUTANEOUS at 23:01

## 2023-07-19 RX ADMIN — Medication 975 MILLIGRAM(S): at 23:02

## 2023-07-19 RX ADMIN — Medication 15 MILLIGRAM(S): at 23:32

## 2023-07-19 RX ADMIN — Medication 975 MILLIGRAM(S): at 23:32

## 2023-07-19 NOTE — ED ADULT TRIAGE NOTE - CHIEF COMPLAINT QUOTE
Patient c/o persisting left flank pain, diagnosed kidney stone on Sunday. Pt ran out of oxycodone that was prescribed.

## 2023-07-20 RX ORDER — OXYCODONE HYDROCHLORIDE 5 MG/1
1 TABLET ORAL
Qty: 12 | Refills: 0
Start: 2023-07-20 | End: 2023-07-23

## 2023-07-20 NOTE — ED PROVIDER NOTE - NSICDXPASTMEDICALHX_GEN_ALL_CORE_FT
PAST MEDICAL HISTORY:  DM (diabetes mellitus)     HTN (hypertension)     Polycystic kidney disease

## 2023-07-20 NOTE — ED ADULT NURSE NOTE - NSFALLUNIVINTERV_ED_ALL_ED
Bed/Stretcher in lowest position, wheels locked, appropriate side rails in place/Call bell, personal items and telephone in reach/Instruct patient to call for assistance before getting out of bed/chair/stretcher/Non-slip footwear applied when patient is off stretcher/[place] to call system/Physically safe environment - no spills, clutter or unnecessary equipment/Purposeful proactive rounding/Room/bathroom lighting operational, light cord in reach

## 2023-07-20 NOTE — ED PROVIDER NOTE - CLINICAL SUMMARY MEDICAL DECISION MAKING FREE TEXT BOX
53M pmhx HTN, HLD, DM, polycystic kidney disease, who presents with left flank pain radiating to LLQ, ongoing intermittently since last 3 days, seen in the ED 7/16/23 and found to have 2-3mm L UVJ stone with mild hydronephrosis. Pt notes constipation since onset of symptoms. Patient has finished oxycodone prescription, last dose yday. He denies nausea, vomiting, fevers, changes in urination or difficulty urinating.  - well appearing, mild LLQ ttp, initial plan for US renal to eval for persistent hydronephrosis but pt felt better after dose of toradol - toradol dosed prior to labs, after return with elevated creatinine - pt instructed to avoid nsaids. pts creatinine compared to results from 3 days prior in ED - no significant change, IV hydration, supportive care, check UA, clinically felt better, stable for dc with return precautions and instructions for repeat lab testing outpt with pmd and urologist

## 2023-07-20 NOTE — ED PROVIDER NOTE - NSFOLLOWUPINSTRUCTIONS_ED_ALL_ED_FT
You were seen for L kidney stone pain    follow up with urologist    return for fevers, difficulty urinating, severe pain    take miralax one capful once a day for constipation     stay hydrated    follow up with primary care doctor for repeat check of creatinine in 5 days    for pain      Take tylenol 650 mg every 6 hours as needed for pain, max daily dose 3250 mg/day.   take oxycodone 5 mg every 8 hours as needed for pain not controlled with tylenol  continue flomax once daily

## 2023-07-20 NOTE — ED ADULT NURSE NOTE - NS ED NURSE IV DC DT
Specialty Pharmacy - Refill Coordination    Specialty Medication Orders Linked to Encounter      Flowsheet Row Most Recent Value   Medication #1 evolocumab (REPATHA SURECLICK) 140 mg/mL PnIj (Order#[id number], Rx#[id number])            Refill Questions - Documented Responses      Flowsheet Row Most Recent Value   Patient Availability and HIPAA Verification    Does patient want to proceed with activity? Yes   HIPAA/medical authority confirmed? Yes   Relationship to patient of person spoken to? Self   Refill Screening Questions    Would patient like to speak to a pharmacist? No   When does the patient need to receive the medication? 04/23/23   Refill Delivery Questions    How will the patient receive the medication? MEDRx   When does the patient need to receive the medication? 04/23/23   Shipping Address Home   Address in [facility] confirmed and updated if neccessary? Yes   Expected Copay ($) 9.76   Is the patient able to afford the medication copay? Yes   Payment Method CC on file   Days supply of Refill 28   Supplies needed? No supplies needed   Refill activity completed? Yes   Refill activity plan Refill scheduled   Shipment/Pickup Date: 04/18/23            Current Outpatient Medications   Medication Sig    ACCU-CHEK EDIN PLUS METER Misc     albuterol (PROVENTIL/VENTOLIN HFA) 90 mcg/actuation inhaler Inhale 2 puffs into the lungs every 6 (six) hours as needed for Wheezing.    albuterol-ipratropium (DUO-NEB) 2.5 mg-0.5 mg/3 mL nebulizer solution Inhale 3 mLs into the lungs.    amLODIPine (NORVASC) 10 MG tablet Take 1 tablet (10 mg total) by mouth once daily.    apixaban (ELIQUIS) 2.5 mg Tab Take 1 tablet (2.5 mg total) by mouth 2 (two) times daily.    aspirin 81 mg Tab Take 81 mg by mouth. 1 Tablet Oral Every day    atorvastatin (LIPITOR) 40 MG tablet Take 1 tablet (40 mg total) by mouth every evening.    blood sugar diagnostic (ACCU-CHEK EDIN PLUS TEST STRP) Strp TEST BLOOD SUGARS 4 TIMES DAILY     "cilostazoL (PLETAL) 100 MG Tab Take 1 tablet (100 mg total) by mouth 2 (two) times daily.    dapagliflozin (FARXIGA) 10 mg tablet Take 1 tablet (10 mg total) by mouth once daily.    diclofenac sodium (VOLTAREN) 1 % Gel Apply 2 g topically 3 (three) times daily. Apply to the area of pain 2-3x per day or night as needed    dulaglutide (TRULICITY) 4.5 mg/0.5 mL pen injector Inject 4.5 mg into the skin every 7 days.    EScitalopram oxalate (LEXAPRO) 10 MG tablet Take 1 tablet (10 mg total) by mouth once daily.    eszopiclone (LUNESTA) 2 MG Tab Take 1 tablet (2 mg total) by mouth every evening.    evolocumab (REPATHA SURECLICK) 140 mg/mL PnIj Inject 1 mL (140 mg total) into the skin every 14 (fourteen) days.    gabapentin (NEURONTIN) 300 MG capsule Take 1 capsule (300 mg) in the morning and 2 capsules (600 mg) in the evening    glipiZIDE (GLUCOTROL) 10 MG TR24 Take 1 tablet (10 mg total) by mouth daily with breakfast.    hydroCHLOROthiazide (HYDRODIURIL) 12.5 MG Tab Take 1 tablet (12.5 mg total) by mouth once daily.    isosorbide mononitrate (ISMO,MONOKET) 10 mg tablet Take 1 tablet (10 mg total) by mouth once daily.    metoprolol tartrate (LOPRESSOR) 50 MG tablet TAKE 1 TABLET BY MOUTH TWICE A DAY    multivitamin (THERAGRAN) per tablet Take 1 tablet by mouth once daily.    pen needle, diabetic (NOVOTWIST) 32 gauge x 1/5" Ndle Use 1 needle to inject insulin four times daily    potassium chloride SA (K-DUR,KLOR-CON) 20 MEQ tablet Take 1 tablet (20 mEq total) by mouth 2 (two) times daily.    promethazine (PHENERGAN) 12.5 MG Tab Take 1 tablet (12.5 mg total) by mouth every 8 (eight) hours as needed (nausea).    telmisartan (MICARDIS) 80 MG Tab Take 1 tablet (80 mg total) by mouth once daily. Stop losartan   Last reviewed on 4/5/2023  9:00 AM by Jasbir Haney MD    Review of patient's allergies indicates:   Allergen Reactions    Milk containing products      Causes GI distress    Last reviewed on  4/5/2023 9:00 AM by Jasbir" Lyndon      Tasks added this encounter   5/14/2023 - Refill Call (Auto Added)   Tasks due within next 3 months   No tasks due.     Olive Mckinney, Patient Care Assistant  [facility]  [address]  [facility] [address]  Phone: [phone]  Fax: [phone]         20-Jul-2023 01:42

## 2023-07-20 NOTE — ED PROVIDER NOTE - NS ED ROS FT
Gen: No fever, normal appetite  Resp: No cough or trouble breathing  Cardiovascular: No chest pain or palpitation  Gastroenteric: No nausea/vomiting, + constipation   :  No change in urine output; no dysuria  MS: No joint or muscle pain, + L flank pain   Remainder negative, except as per the HPI

## 2023-07-20 NOTE — ED PROVIDER NOTE - PATIENT PORTAL LINK FT
You can access the FollowMyHealth Patient Portal offered by [facility] by registering at the following website: [email or web address]. By joining [facility]’s FollowMyHealth portal, you will also be able to view your health information using other applications (apps) compatible with our system.

## 2023-07-20 NOTE — ED ADULT NURSE NOTE - OBJECTIVE STATEMENT
Pt presents to the ED for c/o left flank pain. A&OX4 and in no acute distress. Respirations even and unlabored. All safety maintained.

## 2023-07-20 NOTE — ED PROVIDER NOTE - PHYSICAL EXAMINATION
Gen: AOX3, NAD  Head: NCAT  ENT: Airway patent, moist mucous membranes, nasal passageways clear  Cardiac: Normal rate, normal rhythm  Respiratory: Lungs CTA B/L  Gastrointestinal: Abdomen soft, nontender, mild TTP LLQ,  no rebound, no guarding  MSK: No gross abnormalities, FROM of all four extremities, no edema  HEME: Extremities warm and well perfused   Skin: No rashes, no lesions  Neuro: No gross neurologic deficits, normal speech, no gait abnormality

## 2023-07-20 NOTE — ED PROVIDER NOTE - OBJECTIVE STATEMENT
L flank pain due to kidney stone 53M pmhx HTN, HLD, DM, polycystic kidney disease, who presents with left flank pain radiating to LLQ, ongoing intermittently since last 3 days, seen in the ED 7/16/23 and found to have 2-3mm L UVJ stone with mild hydronephrosis. Pt notes constipation since onset of symptoms. Patient has finished oxycodone prescription, last dose yday. He denies nausea, vomiting, fevers, changes in urination or difficulty urinating.

## 2023-07-21 LAB
CULTURE RESULTS: SIGNIFICANT CHANGE UP
SPECIMEN SOURCE: SIGNIFICANT CHANGE UP

## 2023-07-26 ENCOUNTER — APPOINTMENT (OUTPATIENT)
Dept: INTERNAL MEDICINE | Facility: CLINIC | Age: 53
End: 2023-07-26
Payer: COMMERCIAL

## 2023-07-26 VITALS
HEART RATE: 74 BPM | DIASTOLIC BLOOD PRESSURE: 70 MMHG | SYSTOLIC BLOOD PRESSURE: 118 MMHG | RESPIRATION RATE: 14 BRPM | BODY MASS INDEX: 28 KG/M2 | WEIGHT: 230 LBS

## 2023-07-26 DIAGNOSIS — Z00.00 ENCOUNTER FOR GENERAL ADULT MEDICAL EXAMINATION W/OUT ABNORMAL FINDINGS: ICD-10-CM

## 2023-07-26 DIAGNOSIS — R73.03 PREDIABETES.: ICD-10-CM

## 2023-07-26 DIAGNOSIS — Z78.9 OTHER SPECIFIED HEALTH STATUS: ICD-10-CM

## 2023-07-26 DIAGNOSIS — Z86.19 PERSONAL HISTORY OF OTHER INFECTIOUS AND PARASITIC DISEASES: ICD-10-CM

## 2023-07-26 PROCEDURE — G0444 DEPRESSION SCREEN ANNUAL: CPT | Mod: 59

## 2023-07-26 PROCEDURE — 99386 PREV VISIT NEW AGE 40-64: CPT | Mod: 25

## 2023-07-26 RX ORDER — SITAGLIPTIN AND METFORMIN HYDROCHLORIDE 50; 1000 MG/1; MG/1
50-1000 TABLET, FILM COATED ORAL
Refills: 0 | Status: ACTIVE | COMMUNITY

## 2023-07-26 RX ORDER — SODIUM SULFATE, POTASSIUM SULFATE, MAGNESIUM SULFATE 17.5; 3.13; 1.6 G/ML; G/ML; G/ML
17.5-3.13-1.6 SOLUTION, CONCENTRATE ORAL
Qty: 2 | Refills: 0 | Status: DISCONTINUED | COMMUNITY
Start: 2021-02-01 | End: 2023-07-26

## 2023-07-26 NOTE — HISTORY OF PRESENT ILLNESS
[FreeTextEntry1] : Mr. NG is here for an annual physical examination and assessment.\par  [de-identified] : He generally feels well with no specific complaints. His recent health has been good.\par Recently had  a kidney stone.  Associated with hydro.  Plans to follow up with urology. \par Has PCKD with liver cysts; sister had cerebral aneurysm.  Doesn't see nephrology. \par Results of labs from ER visit reviewed, as was CT scan. \par \par Denies headache, dizziness.\par Denies rash, fatigue, fever, weight loss, anorexia.\par Denies cough, wheezing.\par Denies CP, SOB, CAMPOS, edema, palpitations.\par Denies abdominal pain, N/V/D/C. Denies BRBPR, melena, dysphagia.\par Denies dysuria, frequency, hematuria, hesitancy.\par Denies weakness, numbness, gait instability.

## 2023-07-26 NOTE — HEALTH RISK ASSESSMENT
[Excellent] : ~his/her~  mood as  excellent [No] : In the past 12 months have you used drugs other than those required for medical reasons? No [No falls in past year] : Patient reported no falls in the past year [0] : 2) Feeling down, depressed, or hopeless: Not at all (0) [PHQ-2 Negative - No further assessment needed] : PHQ-2 Negative - No further assessment needed [None] : None [Employed] : employed [Fully functional (bathing, dressing, toileting, transferring, walking, feeding)] : Fully functional (bathing, dressing, toileting, transferring, walking, feeding) [Fully functional (using the telephone, shopping, preparing meals, housekeeping, doing laundry, using] : Fully functional and needs no help or supervision to perform IADLs (using the telephone, shopping, preparing meals, housekeeping, doing laundry, using transportation, managing medications and managing finances) [Reports normal functional visual acuity (ie: able to read med bottle)] : Reports normal functional visual acuity [Smoke Detector] : smoke detector [Seat Belt] :  uses seat belt [Never] : Never [Audit-CScore] : 0 [YPO2Haljp] : 0 [Change in mental status noted] : No change in mental status noted [Language] : denies difficulty with language [Behavior] : denies difficulty with behavior [Learning/Retaining New Information] : denies difficulty learning/retaining new information [Handling Complex Tasks] : denies difficulty handling complex tasks [Reasoning] : denies difficulty with reasoning [Spatial Ability and Orientation] : denies difficulty with spatial ability and orientation [With Family] : lives with family [] :  [Reports changes in hearing] : Reports no changes in hearing [Reports changes in vision] : Reports no changes in vision [Reports changes in dental health] : Reports no changes in dental health

## 2023-07-28 LAB
CHOLEST SERPL-MCNC: 208 MG/DL
CREAT SPEC-SCNC: 217 MG/DL
ESTIMATED AVERAGE GLUCOSE: 177 MG/DL
HBA1C MFR BLD HPLC: 7.8 %
HDLC SERPL-MCNC: 46 MG/DL
LDLC SERPL CALC-MCNC: 136 MG/DL
MICROALBUMIN 24H UR DL<=1MG/L-MCNC: 19.1 MG/DL
MICROALBUMIN/CREAT 24H UR-RTO: 88 MG/G
NONHDLC SERPL-MCNC: 162 MG/DL
TRIGL SERPL-MCNC: 146 MG/DL

## 2023-08-01 ENCOUNTER — NON-APPOINTMENT (OUTPATIENT)
Age: 53
End: 2023-08-01

## 2023-08-01 PROBLEM — I10 ESSENTIAL (PRIMARY) HYPERTENSION: Chronic | Status: ACTIVE | Noted: 2023-07-20

## 2023-08-01 PROBLEM — Q61.3 POLYCYSTIC KIDNEY, UNSPECIFIED: Chronic | Status: ACTIVE | Noted: 2023-07-20

## 2023-08-11 ENCOUNTER — APPOINTMENT (OUTPATIENT)
Dept: NEPHROLOGY | Facility: CLINIC | Age: 53
End: 2023-08-11
Payer: COMMERCIAL

## 2023-08-11 VITALS
HEART RATE: 76 BPM | DIASTOLIC BLOOD PRESSURE: 79 MMHG | TEMPERATURE: 97.5 F | OXYGEN SATURATION: 97 % | HEIGHT: 76 IN | WEIGHT: 238.1 LBS | BODY MASS INDEX: 28.99 KG/M2 | SYSTOLIC BLOOD PRESSURE: 116 MMHG

## 2023-08-11 DIAGNOSIS — N20.0 CALCULUS OF KIDNEY: ICD-10-CM

## 2023-08-11 PROCEDURE — 99204 OFFICE O/P NEW MOD 45 MIN: CPT

## 2023-08-11 NOTE — ASSESSMENT
[FreeTextEntry1] : Mr. 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  Mr. Roque has long-standing CKD, primarily due to PKD, though a diabetic contribution is possible. His mother had ESRD, and likely had PKD as well. Most recently, Mr. Roque had symptomatic kidney stone with blockage leading to CHADD. Initial management focused on checking status to ensure resolution. We will then follow up with longer term plans for overall kidney health.  PKD, CKD II, albuminuria - Recheck labs - Continue losartan - Longer-term monitoring and management pending resolution of acute injury - For PKD, will plan on MRI 3D reconstruction special instructions to calculate TKV (total kidney volume), which will facilitate risk stratification and possible discussion of tolvaptan use - Likely need for either magnetic resonance angiography or computed tomographic angiography for aneurysm screening, particularly given high-risk occupation  Nephrolithiasis with hydronephrosis and CHADD - Sounds like stone has passed, but need to recheck kidney U/S - Check labs for kidney function - Plan for 24 hour urine (LithoLink) once acute issues have resolved - Encouraged high-volume water intake (>2L/day), salt reduction  Diabetes mellitus - Continue Janumet  Hyperlipidemia - Continue simvastatin, though may not be potent enough, in which case would recommend switch to atorvastatin for goal LDL <70   Discussed importance of healthful habits, including physical activity/exercise and improvements in diet, including a focus on high-volume water intake (>2L/day), salt reduction, and increase in plant-based foods.  I have spent a total of 60 minutes in which >50% was spent in discussion with patient regarding chronic kidney disease, PKD, and diet (including counseling on salt intake).

## 2023-08-11 NOTE — HISTORY OF PRESENT ILLNESS
[FreeTextEntry1] : Contacts: 	Dr. Law Cintron (PCP)  ------------------------------------------------------------------------------- Problem List: 	Chronic kidney disease stage II with albuminuria 	Polycystic kidney disease and liver cysts 	Diabetes mellitus (diagnosed about age 51) 	Hyperlipidemia  ------------------------------------------------------------------------------- HPI: Mr. 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  Mr. Roque was diagnosed with polycystic kidney disease about 7-8 years ago, and was diagnosed when an ultrasound was performed for back pain. Didn't have regular medical care until the last handful of years, and was diagnosed with diabetes a few years ago.  Most recently, he was in the ED for pain, and found to have a kidney stone. He was initially treated with Flomax and pain medication, but returned due to lack of pain relief. The pain has since subsided. He is no longer taking the Flomax.  Currently feeling well without concern or issue. No dizziness/lightheadedness, headache, chest pain, dyspnea, cough, abdominal pain, nausea, vomiting, diarrhea, constipation, flank pain, dysuria, hematuria, leg swelling.  Diet is so-so. Doesn't stay very hydrated, and notes that day before symptomatic stone was particularly hot at work. Was exercising, but stopped about 2 months ago.  ------------------------------------------------------------------------------- Social History: 	Lifelong [place]; lives with girlfriend in [place]; has 1 child 	Work for [employer] () 	No history of tobacco, [significant] alcohol, or illicit drug use  Family History: 	Mother had ESRD requiring dialysis and received a transplant;  from complications of transplant  ------------------------------------------------------------------------------- Allergies: Penicllin  Medications: 	Losartan 50mg daily 	Janumet (sitagliptin-metformin) 50/1000mg BID 	Simvastatin 40mg daily 	 ------------------------------------------------------------------------------- Physical Exam: 	Gen: NAD 	HEENT: Supple neck 	Pulm: CTA 	CV: RRR 	Back: No spinal or CVA terndeness 	Abd: +BS, soft, nontender/nondistended 	UE: Warm, FROM, intact strength 	LE: Warm, FROM, intact strength; no edema 	Neuro: No focal deficits, intact gait 	Psych: Normal affect 	Skin: Warm, dry 	 ------------------------------------------------------------------------------- Labs/Studies:   	Kidney Trend: 		2023 SCr 2.63 (eGFR 28) 		2023 SCr 2.59 (eGFR 29) 		2023 SCr 1.40 (eGFR 60) 		2023 SCr 1.20 (eGFR 73) 		2022-11-15 SCr 1.40 		2020 SCr 1.08 		2020 SCr 1.60 		2018 SCr 1.20 		2016 SCr 1.23  	2023 UACR 88 mg/g 	2023 UACR 194 mg/g 	2023 UACR 147 mg/g 	2022-11-15 UACR 669 mg/g 	2020 UACR 347 mg/g 	2020 UACR 236 mg/g 	2018 UACR 99 mg/g 	2016 UACR 44 mg/g  	2023 Hgb 14 	2023 Lipid: chol 208, , HDL 46,  	2023 HbA1c 7.8  	2023 CT A/P 		LIVER: Innumerable cysts and hypodensities too small to characterize. 		KIDNEYS/URETERS: Multiple cysts and hypodensities too small to characterize. Several hyperdensities as well. Left perinephric edema. Mild left hydroureter ureteronephrosis to the level of a 2 to 3 mm distal ureteral calculus (2:144). Nonobstructing 4 mm right lower pole intrarenal calculus.  		IMPRESSION: 			2 to 3 mm left distal ureteral calculus with mild left hydroureteronephrosis and obstructive uropathy. 			Gallbladder sludge versus stones. 			Polycystic kidneys. Several indeterminate hyperdensities. 			Polycystic liver.

## 2023-08-11 NOTE — CONSULT LETTER
[Dear  ___] : Dear  [unfilled], [Courtesy Letter:] : I had the pleasure of seeing your patient, [unfilled], in my office today. [Please see my note below.] : Please see my note below. [Sincerely,] : Sincerely, [FreeTextEntry3] : Sid Engle MD Nephrology

## 2023-09-05 ENCOUNTER — LABORATORY RESULT (OUTPATIENT)
Age: 53
End: 2023-09-05

## 2023-09-19 LAB
25(OH)D3 SERPL-MCNC: 31.6 NG/ML
ALBUMIN SERPL ELPH-MCNC: 4.8 G/DL
ALP BLD-CCNC: 73 U/L
ALT SERPL-CCNC: 20 U/L
ANION GAP SERPL CALC-SCNC: 14 MMOL/L
APPEARANCE: CLEAR
AST SERPL-CCNC: 18 U/L
BACTERIA: NEGATIVE /HPF
BILIRUB SERPL-MCNC: 0.4 MG/DL
BILIRUBIN URINE: NEGATIVE
BLOOD URINE: NEGATIVE
BUN SERPL-MCNC: 20 MG/DL
CALCIUM SERPL-MCNC: 9.5 MG/DL
CALCIUM SERPL-MCNC: 9.5 MG/DL
CAST: 1 /LPF
CHLORIDE SERPL-SCNC: 101 MMOL/L
CO2 SERPL-SCNC: 25 MMOL/L
COLOR: YELLOW
CREAT SERPL-MCNC: 1.59 MG/DL
CREAT SPEC-SCNC: 181 MG/DL
CREAT SPEC-SCNC: 181 MG/DL
CREAT/PROT UR: 0.1 RATIO
CYSTATIN C SERPL-MCNC: 1.22 MG/L
EGFR: 52 ML/MIN/1.73M2
EPITHELIAL CELLS: 0 /HPF
GFR/BSA.PRED SERPLBLD CYS-BASED-ARV: 62 ML/MIN/1.73M2
GLUCOSE QUALITATIVE U: NEGATIVE MG/DL
GLUCOSE SERPL-MCNC: 136 MG/DL
KETONES URINE: NEGATIVE MG/DL
LEUKOCYTE ESTERASE URINE: NEGATIVE
MAGNESIUM SERPL-MCNC: 1.9 MG/DL
MICROALBUMIN 24H UR DL<=1MG/L-MCNC: 6.2 MG/DL
MICROALBUMIN/CREAT 24H UR-RTO: 34 MG/G
MICROSCOPIC-UA: NORMAL
NITRITE URINE: NEGATIVE
PARATHYROID HORMONE INTACT: 61 PG/ML
PH URINE: 6.5
PHOSPHATE SERPL-MCNC: 3 MG/DL
POTASSIUM SERPL-SCNC: 5 MMOL/L
PROT SERPL-MCNC: 7.5 G/DL
PROT UR-MCNC: 18 MG/DL
PROTEIN URINE: NORMAL MG/DL
RED BLOOD CELLS URINE: 1 /HPF
SODIUM SERPL-SCNC: 139 MMOL/L
SPECIFIC GRAVITY URINE: 1.02
URATE SERPL-MCNC: 7.3 MG/DL
UROBILINOGEN URINE: 0.2 MG/DL
WHITE BLOOD CELLS URINE: 0 /HPF

## 2023-09-25 ENCOUNTER — OUTPATIENT (OUTPATIENT)
Dept: OUTPATIENT SERVICES | Facility: HOSPITAL | Age: 53
LOS: 1 days | End: 2023-09-25
Payer: COMMERCIAL

## 2023-09-25 ENCOUNTER — APPOINTMENT (OUTPATIENT)
Dept: MRI IMAGING | Facility: CLINIC | Age: 53
End: 2023-09-25
Payer: COMMERCIAL

## 2023-09-25 DIAGNOSIS — Q61.3 POLYCYSTIC KIDNEY, UNSPECIFIED: ICD-10-CM

## 2023-09-25 PROCEDURE — 70544 MR ANGIOGRAPHY HEAD W/O DYE: CPT | Mod: 26

## 2023-09-25 PROCEDURE — 70544 MR ANGIOGRAPHY HEAD W/O DYE: CPT

## 2023-10-19 ENCOUNTER — NON-APPOINTMENT (OUTPATIENT)
Age: 53
End: 2023-10-19

## 2023-10-20 ENCOUNTER — APPOINTMENT (OUTPATIENT)
Dept: NEPHROLOGY | Facility: CLINIC | Age: 53
End: 2023-10-20
Payer: COMMERCIAL

## 2023-10-20 VITALS
DIASTOLIC BLOOD PRESSURE: 74 MMHG | BODY MASS INDEX: 29.53 KG/M2 | HEART RATE: 99 BPM | TEMPERATURE: 97.6 F | SYSTOLIC BLOOD PRESSURE: 122 MMHG | WEIGHT: 242.5 LBS | HEIGHT: 76 IN | OXYGEN SATURATION: 97 %

## 2023-10-20 PROCEDURE — 99215 OFFICE O/P EST HI 40 MIN: CPT

## 2023-11-20 ENCOUNTER — APPOINTMENT (OUTPATIENT)
Dept: INTERNAL MEDICINE | Facility: CLINIC | Age: 53
End: 2023-11-20
Payer: COMMERCIAL

## 2023-11-20 VITALS — HEART RATE: 78 BPM | DIASTOLIC BLOOD PRESSURE: 60 MMHG | SYSTOLIC BLOOD PRESSURE: 112 MMHG | RESPIRATION RATE: 14 BRPM

## 2023-11-20 PROCEDURE — 99213 OFFICE O/P EST LOW 20 MIN: CPT

## 2023-11-21 LAB
ESTIMATED AVERAGE GLUCOSE: 166 MG/DL
HBA1C MFR BLD HPLC: 7.4 %

## 2023-12-03 ENCOUNTER — NON-APPOINTMENT (OUTPATIENT)
Age: 53
End: 2023-12-03

## 2023-12-22 ENCOUNTER — APPOINTMENT (OUTPATIENT)
Dept: MRI IMAGING | Facility: IMAGING CENTER | Age: 53
End: 2023-12-22
Payer: COMMERCIAL

## 2023-12-22 ENCOUNTER — OUTPATIENT (OUTPATIENT)
Dept: OUTPATIENT SERVICES | Facility: HOSPITAL | Age: 53
LOS: 1 days | End: 2023-12-22
Payer: COMMERCIAL

## 2023-12-22 DIAGNOSIS — Z00.8 ENCOUNTER FOR OTHER GENERAL EXAMINATION: ICD-10-CM

## 2023-12-22 DIAGNOSIS — Q61.3 POLYCYSTIC KIDNEY, UNSPECIFIED: ICD-10-CM

## 2023-12-22 PROCEDURE — 74181 MRI ABDOMEN W/O CONTRAST: CPT | Mod: 26

## 2023-12-22 PROCEDURE — 74181 MRI ABDOMEN W/O CONTRAST: CPT

## 2024-01-06 ENCOUNTER — NON-APPOINTMENT (OUTPATIENT)
Age: 54
End: 2024-01-06

## 2024-01-16 ENCOUNTER — APPOINTMENT (OUTPATIENT)
Dept: INTERNAL MEDICINE | Facility: CLINIC | Age: 54
End: 2024-01-16
Payer: COMMERCIAL

## 2024-01-16 ENCOUNTER — OUTPATIENT (OUTPATIENT)
Dept: OUTPATIENT SERVICES | Facility: HOSPITAL | Age: 54
LOS: 1 days | End: 2024-01-16
Payer: COMMERCIAL

## 2024-01-16 VITALS — SYSTOLIC BLOOD PRESSURE: 124 MMHG | HEART RATE: 74 BPM | RESPIRATION RATE: 14 BRPM | DIASTOLIC BLOOD PRESSURE: 70 MMHG

## 2024-01-16 DIAGNOSIS — I10 ESSENTIAL (PRIMARY) HYPERTENSION: ICD-10-CM

## 2024-01-16 LAB
ALBUMIN SERPL ELPH-MCNC: 4.8 G/DL
ALP BLD-CCNC: 74 U/L
ALT SERPL-CCNC: 30 U/L
ANION GAP SERPL CALC-SCNC: 13 MMOL/L
AST SERPL-CCNC: 25 U/L
BILIRUB SERPL-MCNC: 0.3 MG/DL
BUN SERPL-MCNC: 19 MG/DL
CALCIUM SERPL-MCNC: 9.2 MG/DL
CHLORIDE SERPL-SCNC: 106 MMOL/L
CHOLEST SERPL-MCNC: 188 MG/DL
CO2 SERPL-SCNC: 22 MMOL/L
CREAT SERPL-MCNC: 1.41 MG/DL
EGFR: 60 ML/MIN/1.73M2
GLUCOSE SERPL-MCNC: 141 MG/DL
HDLC SERPL-MCNC: 53 MG/DL
LDLC SERPL CALC-MCNC: 112 MG/DL
NONHDLC SERPL-MCNC: 135 MG/DL
POTASSIUM SERPL-SCNC: 4.7 MMOL/L
PROT SERPL-MCNC: 7.8 G/DL
SODIUM SERPL-SCNC: 141 MMOL/L
TRIGL SERPL-MCNC: 134 MG/DL

## 2024-01-16 PROCEDURE — G0463: CPT

## 2024-01-16 PROCEDURE — G2211 COMPLEX E/M VISIT ADD ON: CPT

## 2024-01-16 PROCEDURE — 99214 OFFICE O/P EST MOD 30 MIN: CPT

## 2024-01-16 NOTE — HISTORY OF PRESENT ILLNESS
[de-identified] : Mr. NG comes in for reassessment of hypertension, diabetes mellitus  and hyperlipidemia.   There have been  no hypoglycemic reactions.  Has been doing well with low fat, calorie restricted diet. Limits alcohol. Moderate exercise at least 150 minutes per week   He denies edema, chest pain, dizziness, CAMPOS, PND and orthopnea.  He  denies foot lesions and dysthesias.   Denies visual problems,  nocturia and  weight change.  He  has had no problems with his medications.  Has been noticing some throat discomfort and AM phlegm he heads to expectorate from throat  
Awake/Alert

## 2024-01-16 NOTE — ASSESSMENT
[FreeTextEntry1] : Goal blood pressure /90 At goal Urged to limit sodium intake   Urged to limit diet  and fat and cholesterol  intake Urged to maintain diet and exercise habits to keep BMI < 28 Limit alcohol consumption to < 7drinks per week Reinforced adherence to medication regimen  Goal A1c LT 7%  Limit carbohydrates in diet Exercise 5-6 days per week Advocated dilated retinal exam yearly Proper foot care reviewed  Refer to ENT

## 2024-01-17 LAB
CREAT SPEC-SCNC: 165 MG/DL
ESTIMATED AVERAGE GLUCOSE: 186 MG/DL
HBA1C MFR BLD HPLC: 8.1 %
MICROALBUMIN 24H UR DL<=1MG/L-MCNC: 12.4 MG/DL
MICROALBUMIN/CREAT 24H UR-RTO: 76 MG/G

## 2024-01-19 ENCOUNTER — APPOINTMENT (OUTPATIENT)
Dept: NEPHROLOGY | Facility: CLINIC | Age: 54
End: 2024-01-19
Payer: COMMERCIAL

## 2024-01-19 VITALS
OXYGEN SATURATION: 96 % | TEMPERATURE: 98 F | HEIGHT: 76 IN | DIASTOLIC BLOOD PRESSURE: 76 MMHG | SYSTOLIC BLOOD PRESSURE: 114 MMHG | HEART RATE: 80 BPM | BODY MASS INDEX: 30.34 KG/M2 | WEIGHT: 249.12 LBS

## 2024-01-19 PROCEDURE — 99215 OFFICE O/P EST HI 40 MIN: CPT

## 2024-01-19 NOTE — ASSESSMENT
[FreeTextEntry1] :  Mr. 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  Mr. Roque has long-standing CKD, primarily due to PKD, though a diabetic contribution is possible. His mother had ESRD, and likely had PKD as well. Most recently, Mr. Roque had symptomatic kidney stone with blockage leading to CHADD.  PKD class 1B, CKD II, albuminuria - Continue losartan - Given current PKD class, defer on tolvaptain treatment at this time - S/p MR for TKV (2023) - S/p MR angio brain w/o evidence of aneurysm (2023) - Discussed consideration of PKD genetic testing, defer for today  Nephrolithiasis with hydronephrosis and CHADD - Plan for 24 hour urine (LithoLink) at future visit(s) - Encouraged high-volume water intake (>2L/day), salt reduction  Diabetes mellitus - Continue Janumet  Hyperlipidemia - Continue simvastatin, though may not be potent enough, in which case would recommend switch to atorvastatin for goal LDL <70   PLAN: - No changes today - Follow up later this year   Discussed importance of healthful habits, including physical activity/exercise and improvements in diet, including a focus on high-volume water intake (>2L/day), salt reduction, and increase in plant-based foods.  I spent a total of 40 minutes on this encounter.

## 2024-01-19 NOTE — HISTORY OF PRESENT ILLNESS
[FreeTextEntry1] : Contacts:  Dr. Law Cintron (PCP)  ------------------------------------------------------------------------------- Problem List:  Chronic kidney disease stage II with albuminuria  Polycystic kidney disease and liver cysts (diagnosed about 7-8 years ago), [place] class 1B  Nephrolithiasis  Diabetes mellitus (diagnosed about age 51)  Hyperlipidemia  ------------------------------------------------------------------------------- HPI: Mr. 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  Last saw Mr. Roque in 2023. Feeling well without significant intevral events. He notes that he's drinking more water.  Currently feeling well without concern or issue. No dizziness/lightheadedness, headache, chest pain, dyspnea, cough, abdominal pain, nausea, vomiting, diarrhea, constipation, flank pain, dysuria, hematuria, leg swelling.  ------------------------------------------------------------------------------- Social History:  Lifelong [place]; lives with girlfriend in [place]; has 1 child  Work for [employer] ()  No history of tobacco, [significant] alcohol, or illicit drug use  Family History:  Mother had ESRD requiring dialysis and received a transplant;  from complications of transplant  ------------------------------------------------------------------------------- Allergies: Penicllin  Medications:  Losartan 50mg daily  Janumet (sitagliptin-metformin) 50/1000mg BID  Simvastatin 40mg daily  ------------------------------------------------------------------------------- Physical Exam:   Gen: NAD  HEENT: Supple neck  Pulm: CTA  CV: RRR  Back: No spinal or CVA terndeness  Abd: +BS, soft, nontender/nondistended  UE: Warm, FROM, intact strength  LE: Warm, FROM, intact strength; no edema  Neuro: No focal deficits, intact gait  Psych: Normal affect  Skin: Warm, dry  ------------------------------------------------------------------------------- Labs/Studies:   2024    141 | 106 | 19   -----------------< 141 Ca 9.2 eGFR 60   4.7 |  22 | 1.41    Albumin 4.8   Kidney Trend:   2024 SCr 1.41 (eGFR 60)   2023 SCr 1.59 (eGFR 52; Cystatin-C 1.22, eGFR 62)   2023 SCr 2.63 (eGFR 28)   2023 SCr 2.59 (eGFR 29)   2023 SCr 1.40 (eGFR 60)   2023 SCr 1.20 (eGFR 73)   2022-11-15 SCr 1.40   2020 SCr 1.08   2020 SCr 1.60   2018 SCr 1.20   2016 SCr 1.23   2024 UACR 76 mg/g  2023 UACR 34 mg/g  2023 UACR 88 mg/g  2023 UACR 194 mg/g  2023 UACR 147 mg/g  2022-11-15 UACR 669 mg/g  2020 UACR 347 mg/g  2020 UACR 236 mg/g  2018 UACR 99 mg/g  2016 UACR 44 mg/g    2023 UPCR 0.1 g/g   2023 Hgb 13.3  2024   2024 HbA1c 8.1  2023 PTH 61 (Ca 9.5)  2023 Vitamin D (25OH) 32      2023 MR Abd no contrast TKV    LIVER: Normal morphology. Numerous bilobar cysts one of which in the right hepatic lobe demonstrates internal hemorrhagic component.    KIDNEY: Multiple bilateral renal cysts some of which demonstrate internal hemorrhagic components.        Right renal volume: 506 ml    Left renal volume: 459 ml.    Combined renal volume: 965 ml    >>>>>>>> HEIGHT ADJUSTED , ADPKD CLASS 1B	    2023 MR angio brain: [place] and vertebrobasilar system are otherwise unremarkable without evidence of stenosis, occlusion or saccular aneurysm dilation   2023 CT A/P   LIVER: Innumerable cysts and hypodensities too small to characterize.   KIDNEYS/URETERS: Multiple cysts and hypodensities too small to characterize. Several hyperdensities as well. Left perinephric edema. Mild left hydroureter ureteronephrosis to the level of a 2 to 3 mm distal ureteral calculus (2:144). Nonobstructing 4 mm right lower pole intrarenal calculus.    IMPRESSION:   2 to 3 mm left distal ureteral calculus with mild left hydroureteronephrosis and obstructive uropathy.   Gallbladder sludge versus stones.   Polycystic kidneys. Several indeterminate hyperdensities.   Polycystic liver.

## 2024-01-23 DIAGNOSIS — E78.5 HYPERLIPIDEMIA, UNSPECIFIED: ICD-10-CM

## 2024-01-23 DIAGNOSIS — R07.0 PAIN IN THROAT: ICD-10-CM

## 2024-01-23 DIAGNOSIS — E11.9 TYPE 2 DIABETES MELLITUS WITHOUT COMPLICATIONS: ICD-10-CM

## 2024-03-02 NOTE — ED PROVIDER NOTE - BIRTH SEX
PAST MEDICAL HISTORY:  History of Osteoarthritis     Hyperlipidemia     Obesity (BMI 30-39.9)     Restless Leg Syndrome     Traumatic arthropathy involving lower leg right     Male

## 2024-03-08 ENCOUNTER — APPOINTMENT (OUTPATIENT)
Dept: OTOLARYNGOLOGY | Facility: CLINIC | Age: 54
End: 2024-03-08
Payer: COMMERCIAL

## 2024-03-08 ENCOUNTER — NON-APPOINTMENT (OUTPATIENT)
Age: 54
End: 2024-03-08

## 2024-03-08 VITALS — DIASTOLIC BLOOD PRESSURE: 75 MMHG | HEART RATE: 76 BPM | SYSTOLIC BLOOD PRESSURE: 120 MMHG

## 2024-03-08 VITALS — TEMPERATURE: 97.8 F | HEIGHT: 76 IN | BODY MASS INDEX: 30.34 KG/M2 | WEIGHT: 249.12 LBS

## 2024-03-08 DIAGNOSIS — K21.9 GASTRO-ESOPHAGEAL REFLUX DISEASE W/OUT ESOPHAGITIS: ICD-10-CM

## 2024-03-08 DIAGNOSIS — R07.0 PAIN IN THROAT: ICD-10-CM

## 2024-03-08 DIAGNOSIS — R09.A2 FOREIGN BODY SENSATION, THROAT: ICD-10-CM

## 2024-03-08 PROCEDURE — 31575 DIAGNOSTIC LARYNGOSCOPY: CPT

## 2024-03-08 PROCEDURE — 99203 OFFICE O/P NEW LOW 30 MIN: CPT | Mod: 25

## 2024-03-08 NOTE — HISTORY OF PRESENT ILLNESS
[de-identified] : Mr. NG is a 53 year male c/o excess phlegm in his throat which he is unable to remove. on occasion he will vomit 2/2 feeling the phlegm is stuck in his throat for years. 10 years ago pt states he sneezed and tasted blood and felt discomfort on L side of the throat.  denies dysphagia, dyspnea or dysphonia, tobacco or cough denies sinus pressure, rhinorrhea, nasal obstruction denies heartburn or reflux medications

## 2024-03-08 NOTE — PHYSICAL EXAM
[Normal] : mucosa is normal [Midline] : trachea located in midline position [Laryngoscopy Performed] : laryngoscopy was performed, see procedure section for findings [de-identified] : 1+ bilateral [de-identified] : uvula elongated

## 2024-03-08 NOTE — PROCEDURE
[de-identified] : reason for laryngoscopy: unable to cooperate with mirror   Fiberoptic laryngoscopy was performed on the patient.  R/b/a was explained to the patient and they agreed to proceed with procedure.   Nasal cavities: clear b/l, Nasopharynx: mild erythema and swelling, Oropharynx/Hypopharynx: + lingual tonsillar fullness, no masses or lesions, posterior pharyngeal wall with cobblestoning.  No BOT hypertrophy, vallecula is clear of any masses or cysts, Supraglottis: epiglottis sharp with normal bilateral arytenoids, aryepiglottic folds, ventricles but with + interarytenoid edema consistent with reflux, Glottis: clear, TVCs mobile b/l WITH SMALL AMOUNT OF MUCOUS ON VC  Subglottis clear  No pooling of secretions in the pyriform sinus.  Airway patent  Scope #: 26

## 2024-03-08 NOTE — ASSESSMENT
[FreeTextEntry1] : Mr. NG is a 53 year male with globus and excess phlegm for years. On exam he has evidence of reflux, no masses or polyps - rx Omeprazole 40 mg daily x 1 month, then d/c - information given for lifestyle modifications to help with the inflammation in the throat - avoid acidic and spicy foods - limit caffeine and carbonated drinks - try not to eat 2-3 hours before bedtime and elevate head when sleeping  f/u as needed

## 2024-03-08 NOTE — END OF VISIT
[FreeTextEntry3] : I personally saw and examined KIP NG in detail.  I spoke to KELIN Conner regarding the assessment and plan of care. I performed the procedures and relevant physical exam.  I have reviewed the above assessment and plan of care and I agree.  I have made changes to the body of the note wherever necessary and appropriate.

## 2024-03-08 NOTE — REASON FOR VISIT
[Initial Consultation] : an initial consultation for [Throat Pain] : throat pain [FreeTextEntry2] : phlegm

## 2024-04-05 ENCOUNTER — RX RENEWAL (OUTPATIENT)
Age: 54
End: 2024-04-05

## 2024-04-05 RX ORDER — OMEPRAZOLE 40 MG/1
40 CAPSULE, DELAYED RELEASE ORAL
Qty: 30 | Refills: 0 | Status: ACTIVE | COMMUNITY
Start: 2024-03-08 | End: 1900-01-01

## 2024-05-13 ENCOUNTER — RX RENEWAL (OUTPATIENT)
Age: 54
End: 2024-05-13

## 2024-05-17 ENCOUNTER — APPOINTMENT (OUTPATIENT)
Dept: NEPHROLOGY | Facility: CLINIC | Age: 54
End: 2024-05-17
Payer: COMMERCIAL

## 2024-05-17 VITALS — SYSTOLIC BLOOD PRESSURE: 130 MMHG | HEART RATE: 74 BPM | DIASTOLIC BLOOD PRESSURE: 86 MMHG

## 2024-05-17 VITALS
HEART RATE: 81 BPM | SYSTOLIC BLOOD PRESSURE: 132 MMHG | DIASTOLIC BLOOD PRESSURE: 85 MMHG | HEIGHT: 76 IN | WEIGHT: 248 LBS | OXYGEN SATURATION: 95 % | TEMPERATURE: 97.6 F | BODY MASS INDEX: 30.2 KG/M2

## 2024-05-17 DIAGNOSIS — N18.30 CHRONIC KIDNEY DISEASE, STAGE 3 UNSPECIFIED: ICD-10-CM

## 2024-05-17 DIAGNOSIS — Q61.3 POLYCYSTIC KIDNEY, UNSPECIFIED: ICD-10-CM

## 2024-05-17 DIAGNOSIS — N20.0 CALCULUS OF KIDNEY: ICD-10-CM

## 2024-05-17 DIAGNOSIS — E78.5 HYPERLIPIDEMIA, UNSPECIFIED: ICD-10-CM

## 2024-05-17 DIAGNOSIS — R80.9 PROTEINURIA, UNSPECIFIED: ICD-10-CM

## 2024-05-17 PROCEDURE — G2211 COMPLEX E/M VISIT ADD ON: CPT | Mod: NC,1L

## 2024-05-17 PROCEDURE — 99214 OFFICE O/P EST MOD 30 MIN: CPT

## 2024-05-17 RX ORDER — LOSARTAN POTASSIUM 100 MG/1
100 TABLET, FILM COATED ORAL DAILY
Qty: 90 | Refills: 3 | Status: ACTIVE | COMMUNITY
Start: 2024-05-17

## 2024-05-17 RX ORDER — SIMVASTATIN 40 MG/1
40 TABLET, FILM COATED ORAL DAILY
Qty: 90 | Refills: 3 | Status: ACTIVE | COMMUNITY

## 2024-05-17 RX ORDER — LOSARTAN POTASSIUM 25 MG/1
25 TABLET, FILM COATED ORAL
Refills: 0 | Status: DISCONTINUED | COMMUNITY
End: 2024-05-17

## 2024-05-17 NOTE — HISTORY OF PRESENT ILLNESS
[FreeTextEntry1] : Contacts:  Dr. 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  Last saw Mr. Roque in 2024. Feeling well without significant intevral events. He notes that he continues to drink more water (and "is trying"). His current losartan dose is 100mg (I had 50mg and Dr. Cintron 25mg in our notes).  Notes that he put on weight and has been doing takeout a bit too much.  Currently feeling well without concern or issue. No dizziness/lightheadedness, headache, chest pain, dyspnea, cough, abdominal pain, nausea, vomiting, diarrhea, constipation, flank pain, dysuria, hematuria, leg swelling.  ------------------------------------------------------------------------------- Social History:  Lifelong [place]; lives with girlfriend in [place]; has 1 child  Work for [employer] ()  No history of tobacco, [significant] alcohol, or illicit drug use  Family History:  Mother had ESRD requiring dialysis and received a transplant;  from complications of transplant  ------------------------------------------------------------------------------- Allergies: Penicllin  Medications:  Losartan 100mg daily  Janumet (sitagliptin-metformin) 50/1000mg BID  Simvastatin 40mg daily  ------------------------------------------------------------------------------- Physical Exam:   128/85, 73  133/86, 75   Gen: NAD  HEENT: Supple neck  Pulm: CTA  CV: RRR  Back: No spinal or CVA terndeness  Abd: +BS, soft, nontender/nondistended  UE: Warm, FROM, intact strength  LE: Warm, FROM, intact strength; no edema  Neuro: No focal deficits, intact gait  Psych: Normal affect  Skin: Warm, dry  ------------------------------------------------------------------------------- Labs/Studies:   2024    141 | 106 | 19   -----------------< 141 Ca 9.2 eGFR 60   4.7 | 22 | 1.41    Albumin 4.8   Kidney Trend:   2024 SCr 1.41 (eGFR 60)   2023 SCr 1.59 (eGFR 52; Cystatin-C 1.22, eGFR 62)   2023 SCr 2.63 (eGFR 28)   2023 SCr 2.59 (eGFR 29)   2023 SCr 1.40 (eGFR 60)   2023 SCr 1.20 (eGFR 73)   2022-11-15 SCr 1.40   2020 SCr 1.08   2020 SCr 1.60   2018 SCr 1.20   2016 SCr 1.23   2024 UACR 76 mg/g  2023 UACR 34 mg/g  2023 UACR 88 mg/g  2023 UACR 194 mg/g  2023 UACR 147 mg/g  2022-11-15 UACR 669 mg/g  2020 UACR 347 mg/g  2020 UACR 236 mg/g  2018 UACR 99 mg/g  2016 UACR 44 mg/g    2023 UPCR 0.1 g/g   2023 Hgb 13.3  2024   2024 HbA1c 8.1  2023 PTH 61 (Ca 9.5)  2023 Vitamin D (25OH) 32    2023 MR Abd no contrast TKV   LIVER: Normal morphology. Numerous bilobar cysts one of which in the right hepatic lobe demonstrates internal hemorrhagic component.   KIDNEY: Multiple bilateral renal cysts some of which demonstrate internal hemorrhagic components.    Right renal volume: 506 ml   Left renal volume: 459 ml.   Combined renal volume: 965 ml    >>>>>>>> HEIGHT ADJUSTED , ADPKD CLASS 1B    2023 MR angio brain: [place] and vertebrobasilar system are otherwise unremarkable without evidence of stenosis, occlusion or saccular aneurysm dilation   2023 CT A/P   LIVER: Innumerable cysts and hypodensities too small to characterize.   KIDNEYS/URETERS: Multiple cysts and hypodensities too small to characterize. Several hyperdensities as well. Left perinephric edema. Mild left hydroureter ureteronephrosis to the level of a 2 to 3 mm distal ureteral calculus (2:144). Nonobstructing 4 mm right lower pole intrarenal calculus.    IMPRESSION:   2 to 3 mm left distal ureteral calculus with mild left hydroureteronephrosis and obstructive uropathy.   Gallbladder sludge versus stones.   Polycystic kidneys. Several indeterminate hyperdensities.   Polycystic liver.

## 2024-05-17 NOTE — ASSESSMENT
[FreeTextEntry1] :  Mr. 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  Mr. Roque has long-standing CKD, primarily due to PKD, though a diabetic contribution is possible. His mother had ESRD, and likely had PKD as well. Most recently, Mr. Roque had symptomatic kidney stone with blockage leading to CHADD.  PKD class 1B, CKD II, albuminuria - Continue losartan - Given current PKD class, defer on tolvaptain treatment at this time - S/p MR for TKV (2023) - S/p MR angio brain w/o evidence of aneurysm (2023) - Discussed consideration of PKD genetic testing; defer for today  Hypertension - elevated today - Continue losartan 100mg; can change to more potent ARB if SBP remains >120 - Discussed sodium reduction, potassium increase, and focus on weight loss and exercise  Nephrolithiasis with history of hydronephrosis and CHADD - Can consider 24 hour urine ([facility]) at future visit(s) - Encouraged high-volume water intake (>2L/day), salt reduction  Diabetes mellitus - Continue Janumet  Hyperlipidemia - Continue simvastatin, though may not be potent enough, in which case would recommend switch to atorvastatin for goal LDL <70   PLAN: - No changes today - Labs today; will contact patient for any abnormal/concerning findings - Follow up 6 months   Discussed importance of healthful habits, including physical activity/exercise and improvements in diet, including a focus on high-volume water intake (>2L/day), salt reduction, and increase in plant-based foods.  I spent a total of 30 minutes on this encounter.

## 2024-05-23 ENCOUNTER — APPOINTMENT (OUTPATIENT)
Dept: INTERNAL MEDICINE | Facility: CLINIC | Age: 54
End: 2024-05-23

## 2024-06-10 LAB
ALBUMIN SERPL ELPH-MCNC: 4.6 G/DL
ALP BLD-CCNC: 74 U/L
ALT SERPL-CCNC: 37 U/L
ANION GAP SERPL CALC-SCNC: 12 MMOL/L
AST SERPL-CCNC: 26 U/L
BILIRUB SERPL-MCNC: 0.2 MG/DL
BUN SERPL-MCNC: 19 MG/DL
CALCIUM SERPL-MCNC: 9.2 MG/DL
CHLORIDE SERPL-SCNC: 103 MMOL/L
CHOLEST SERPL-MCNC: 195 MG/DL
CO2 SERPL-SCNC: 22 MMOL/L
CREAT SERPL-MCNC: 1.47 MG/DL
CREAT SPEC-SCNC: 145 MG/DL
CYSTATIN C SERPL-MCNC: 1.12 MG/L
EGFR: 57 ML/MIN/1.73M2
ESTIMATED AVERAGE GLUCOSE: 189 MG/DL
GFR/BSA.PRED SERPLBLD CYS-BASED-ARV: 69 ML/MIN/1.73M2
GLUCOSE SERPL-MCNC: 158 MG/DL
HBA1C MFR BLD HPLC: 8.2 %
HCT VFR BLD CALC: 42.9 %
HDLC SERPL-MCNC: 44 MG/DL
HGB BLD-MCNC: 13.7 G/DL
LDLC SERPL CALC-MCNC: 134 MG/DL
MCHC RBC-ENTMCNC: 27.5 PG
MCHC RBC-ENTMCNC: 31.9 GM/DL
MCV RBC AUTO: 86 FL
MICROALBUMIN 24H UR DL<=1MG/L-MCNC: 24.9 MG/DL
MICROALBUMIN/CREAT 24H UR-RTO: 172 MG/G
NONHDLC SERPL-MCNC: 152 MG/DL
PLATELET # BLD AUTO: 221 K/UL
POTASSIUM SERPL-SCNC: 5.2 MMOL/L
PROT SERPL-MCNC: 7.4 G/DL
RBC # BLD: 4.99 M/UL
RBC # FLD: 12.1 %
SODIUM SERPL-SCNC: 137 MMOL/L
TRIGL SERPL-MCNC: 97 MG/DL
WBC # FLD AUTO: 5.27 K/UL

## 2024-06-19 ENCOUNTER — OUTPATIENT (OUTPATIENT)
Dept: OUTPATIENT SERVICES | Facility: HOSPITAL | Age: 54
LOS: 1 days | End: 2024-06-19
Payer: COMMERCIAL

## 2024-06-19 ENCOUNTER — APPOINTMENT (OUTPATIENT)
Dept: INTERNAL MEDICINE | Facility: CLINIC | Age: 54
End: 2024-06-19
Payer: COMMERCIAL

## 2024-06-19 VITALS — HEART RATE: 70 BPM | RESPIRATION RATE: 14 BRPM | DIASTOLIC BLOOD PRESSURE: 60 MMHG | SYSTOLIC BLOOD PRESSURE: 136 MMHG

## 2024-06-19 DIAGNOSIS — I10 ESSENTIAL (PRIMARY) HYPERTENSION: ICD-10-CM

## 2024-06-19 DIAGNOSIS — K46.9 UNSPECIFIED ABDOMINAL HERNIA W/OUT OBSTRUCTION OR GANGRENE: ICD-10-CM

## 2024-06-19 DIAGNOSIS — E11.9 TYPE 2 DIABETES MELLITUS W/OUT COMPLICATIONS: ICD-10-CM

## 2024-06-19 PROCEDURE — G0463: CPT

## 2024-06-19 PROCEDURE — 99214 OFFICE O/P EST MOD 30 MIN: CPT

## 2024-06-19 NOTE — HISTORY OF PRESENT ILLNESS
[de-identified] : Mr. NG comes in for reassessment of hypertension, diabetes mellitus  and hyperlipidemia.  . There have been  no hypoglycemic reactions.  Has been doing well with low fat, calorie restricted diet. Limits alcohol. Moderate exercise at least 150 minutes per week   He denies edema, chest pain, dizziness, CAMPOS, PND and orthopnea.  He  denies foot lesions and dysthesias.   Denies visual problems,  nocturia and  weight change.  He  has had no problems with his medications. Would like to see a surgeon to discuss options to repair a hernia l

## 2024-06-19 NOTE — ASSESSMENT
[FreeTextEntry1] : Goal blood pressure /90 At goal Urged to limit sodium intake     Urged to limit diet  and fat and cholesterol  intake Urged to maintain diet and exercise habits to keep BMI < 28 Limit alcohol consumption to < 7drinks per week Reinforced adherence to medication regimen    Goal A1c LT 7%    Not at goal but admits to some dietary indiscretion  Limit carbohydrates in diet Exercise 5-6 days per week Advocated dilated retinal exam yearly Proper foot care reviewed

## 2024-06-24 DIAGNOSIS — E11.9 TYPE 2 DIABETES MELLITUS WITHOUT COMPLICATIONS: ICD-10-CM

## 2024-06-24 DIAGNOSIS — K46.9 UNSPECIFIED ABDOMINAL HERNIA WITHOUT OBSTRUCTION OR GANGRENE: ICD-10-CM

## 2024-08-07 ENCOUNTER — APPOINTMENT (OUTPATIENT)
Dept: SURGERY | Facility: CLINIC | Age: 54
End: 2024-08-07

## 2024-08-07 PROBLEM — K42.9 UMBILICAL HERNIA: Status: ACTIVE | Noted: 2024-08-07

## 2024-08-07 PROCEDURE — 99203 OFFICE O/P NEW LOW 30 MIN: CPT

## 2024-08-07 NOTE — CONSULT LETTER
[Dear  ___] : Dear  [unfilled], [Consult Letter:] : I had the pleasure of evaluating your patient, [unfilled]. [Please see my note below.] : Please see my note below. [Consult Closing:] : Thank you very much for allowing me to participate in the care of this patient.  If you have any questions, please do not hesitate to contact me. [Sincerely,] : Sincerely, [FreeTextEntry3] : Mihai Chaney MD, FACS [facility] [address]  [place]  [address] Tel: [phone] Email: [email or web address]

## 2024-08-07 NOTE — PHYSICAL EXAM
[Normal Breath Sounds] : Normal breath sounds [Normal Heart Sounds] : normal heart sounds [No Rash or Lesion] : No rash or lesion [Calm] : calm [de-identified] : No acute distress [de-identified] : Sclera clear [de-identified] : Supple [de-identified] : Soft and nontender.  There is a small easily reducible nontender umbilical hernia.  The fascial defect feels to be less than a centimeter.  There is a small easily reducible nontender left inguinal hernia [de-identified] : No clubbing cyanosis or edema [de-identified] : Cranial nerves II through XII grossly intact

## 2024-08-07 NOTE — HISTORY OF PRESENT ILLNESS
[de-identified] : KIP  is a 54 year  male  here for a consultation for possible hernia [de-identified] : This 54-year-old patient complains of an umbilical hernia that has been present for some months.  He denies any pain from this hernia.  He is concerned about the hernia because she wants to start exercising and get in shape.  He denies any change in bowel or urinary habits.  He denies any pain or bulge in the left inguinal area.

## 2024-08-07 NOTE — CONSULT LETTER
[Dear  ___] : Dear  [unfilled], [Consult Letter:] : I had the pleasure of evaluating your patient, [unfilled]. [Please see my note below.] : Please see my note below. [Consult Closing:] : Thank you very much for allowing me to participate in the care of this patient.  If you have any questions, please do not hesitate to contact me. [Sincerely,] : Sincerely, [FreeTextEntry3] : Mihai Chaney MD, FACS [facility] [address]  [place]  [address] Tel: [phone] Email: [email or web address]

## 2024-08-07 NOTE — ASSESSMENT
[FreeTextEntry1] : This 54-year-old patient is suffering from an umbilical hernia.  I have offered him repair of this fascial defect.  The procedure as well as risks(including, but not limited to bleeding, infection, injury to hollow viscus, or other organs), benefits (pain relief), and alternatives were explained to the patient.   His left inguinal hernia is small and asymptomatic and will not be addressed at this time.  I am concerned about his elevated hemoglobin A1c and would like to see it under 7 prior to surgery.

## 2024-08-07 NOTE — DATA REVIEWED
[FreeTextEntry1] : I reviewed the CAT scan images from July 2023.  Demonstrate a small umbilical hernia a small left inguinal hernia and cystic disease within the liver.  I also reviewed blood work from May 17 including a normal CBC, complete metabolic panel significant for glucose of 159 and creatinine of 1.47.  His hemoglobin A1c on the same day was 8.2

## 2024-08-07 NOTE — HISTORY OF PRESENT ILLNESS
[de-identified] : KIP  is a 54 year  male  here for a consultation for possible hernia [de-identified] : This 54-year-old patient complains of an umbilical hernia that has been present for some months.  He denies any pain from this hernia.  He is concerned about the hernia because she wants to start exercising and get in shape.  He denies any change in bowel or urinary habits.  He denies any pain or bulge in the left inguinal area.

## 2024-08-07 NOTE — PHYSICAL EXAM
[Normal Breath Sounds] : Normal breath sounds [Normal Heart Sounds] : normal heart sounds [No Rash or Lesion] : No rash or lesion [Calm] : calm [de-identified] : No acute distress [de-identified] : Sclera clear [de-identified] : Supple [de-identified] : Soft and nontender.  There is a small easily reducible nontender umbilical hernia.  The fascial defect feels to be less than a centimeter.  There is a small easily reducible nontender left inguinal hernia [de-identified] : No clubbing cyanosis or edema [de-identified] : Cranial nerves II through XII grossly intact

## 2024-08-20 ENCOUNTER — APPOINTMENT (OUTPATIENT)
Dept: ORTHOPEDIC SURGERY | Facility: CLINIC | Age: 54
End: 2024-08-20
Payer: COMMERCIAL

## 2024-08-20 VITALS
HEIGHT: 76 IN | HEART RATE: 86 BPM | DIASTOLIC BLOOD PRESSURE: 92 MMHG | BODY MASS INDEX: 30.44 KG/M2 | SYSTOLIC BLOOD PRESSURE: 143 MMHG | WEIGHT: 250 LBS

## 2024-08-20 DIAGNOSIS — M76.61 ACHILLES TENDINITIS, RIGHT LEG: ICD-10-CM

## 2024-08-20 PROCEDURE — 99214 OFFICE O/P EST MOD 30 MIN: CPT

## 2024-08-20 PROCEDURE — 73610 X-RAY EXAM OF ANKLE: CPT | Mod: RT

## 2024-08-20 NOTE — DISCUSSION/SUMMARY
[de-identified] : The patient has Achilles tendinosis at the right ankle.  I have discussed the pathology, natural history and treatment options with him.  He is started on a calf stretching program.  If still symptomatic in 1 month he will return.

## 2024-08-20 NOTE — HISTORY OF PRESENT ILLNESS
[de-identified] : 54-year-old male presents for evaluation of right ankle pain x 4 weeks. He cannot recall particular trauma or injury. He complains of constant sharp pain in the ankle worse with prolonged walking. Pain is localized to the Achilles insertion. He has tried heat with mild relief. Denies numbness/tingling. He reports an ankle injury about 8 years ago but never had this evaluated.

## 2024-08-20 NOTE — PHYSICAL EXAM
[DP] : dorsalis pedis 2+ and symmetric bilaterally [PT] : posterior tibial 2+ and symmetric bilaterally [Normal] : Alert and in no acute distress [Poor Appearance] : well-appearing [Acute Distress] : not in acute distress [Obese] : not obese [de-identified] : The patient has no respiratory distress. Mood and affect are normal. The patient is alert and oriented to person, place and time. There is no pain with motion of the knees.  Both knees are stable.  Calves are soft and nontender.  There is mild tenderness at Achilles insertion on the right.  He has bilateral tight calf muscles worse on the right than the left.  There is no tenderness of the midfoot or forefoot.  The skin is intact.  There is no lymphedema. [de-identified] : AP, lateral and mortise x-rays of the right ankle demonstrate no acute fracture or dislocation.  There are mild midfoot degenerative changes.  There are is an area of calcification at the Achilles tendon insertion

## 2024-08-30 ENCOUNTER — APPOINTMENT (OUTPATIENT)
Dept: INTERNAL MEDICINE | Facility: CLINIC | Age: 54
End: 2024-08-30
Payer: COMMERCIAL

## 2024-08-30 ENCOUNTER — RESULT REVIEW (OUTPATIENT)
Age: 54
End: 2024-08-30

## 2024-08-30 VITALS
WEIGHT: 249 LBS | BODY MASS INDEX: 30.32 KG/M2 | OXYGEN SATURATION: 95 % | HEART RATE: 81 BPM | HEIGHT: 76 IN | DIASTOLIC BLOOD PRESSURE: 78 MMHG | SYSTOLIC BLOOD PRESSURE: 130 MMHG

## 2024-08-30 DIAGNOSIS — N20.0 CALCULUS OF KIDNEY: ICD-10-CM

## 2024-08-30 PROCEDURE — 99213 OFFICE O/P EST LOW 20 MIN: CPT | Mod: GE

## 2024-08-30 NOTE — HISTORY OF PRESENT ILLNESS
[FreeTextEntry8] : 54M PMHx htn, DM, LDL, CKD2, nephrolithiasis p/w L flank pain.   #L flank pain  - Started 2 weeks ago  - Denies n/v/d/f/c  - Does not radiate elsewhere  - No urinary sx  - Worse with movement  - Did not take pain medications  - Similar experience prior, but unsure if pain is similar to prior episode of kidney stone

## 2024-08-30 NOTE — PHYSICAL EXAM
[No Acute Distress] : no acute distress [Well Nourished] : well nourished [Well Developed] : well developed [Well-Appearing] : well-appearing [Normal Sclera/Conjunctiva] : normal sclera/conjunctiva [PERRL] : pupils equal round and reactive to light [EOMI] : extraocular movements intact [Normal Outer Ear/Nose] : the outer ears and nose were normal in appearance [Normal Oropharynx] : the oropharynx was normal [No JVD] : no jugular venous distention [No Lymphadenopathy] : no lymphadenopathy [Supple] : supple [Thyroid Normal, No Nodules] : the thyroid was normal and there were no nodules present [No Respiratory Distress] : no respiratory distress  [No Accessory Muscle Use] : no accessory muscle use [Clear to Auscultation] : lungs were clear to auscultation bilaterally [Normal Rate] : normal rate  [Regular Rhythm] : with a regular rhythm [Normal S1, S2] : normal S1 and S2 [No Murmur] : no murmur heard [No Carotid Bruits] : no carotid bruits [No Abdominal Bruit] : a ~M bruit was not heard ~T in the abdomen [No Varicosities] : no varicosities [Pedal Pulses Present] : the pedal pulses are present [No Edema] : there was no peripheral edema [No Palpable Aorta] : no palpable aorta [No Extremity Clubbing/Cyanosis] : no extremity clubbing/cyanosis [Soft] : abdomen soft [Non Tender] : non-tender [Non-distended] : non-distended [No Masses] : no abdominal mass palpated [No HSM] : no HSM [Normal Bowel Sounds] : normal bowel sounds [Normal Posterior Cervical Nodes] : no posterior cervical lymphadenopathy [Normal Anterior Cervical Nodes] : no anterior cervical lymphadenopathy [No CVA Tenderness] : no CVA  tenderness [No Spinal Tenderness] : no spinal tenderness [No Joint Swelling] : no joint swelling [Grossly Normal Strength/Tone] : grossly normal strength/tone [No Rash] : no rash [Coordination Grossly Intact] : coordination grossly intact [No Focal Deficits] : no focal deficits [Normal Gait] : normal gait [Normal Affect] : the affect was normal [Deep Tendon Reflexes (DTR)] : deep tendon reflexes were 2+ and symmetric [Normal Insight/Judgement] : insight and judgment were intact [de-identified] : Some discomfort with l hip flexion, no pain

## 2024-08-31 ENCOUNTER — APPOINTMENT (OUTPATIENT)
Dept: CT IMAGING | Facility: CLINIC | Age: 54
End: 2024-08-31

## 2024-08-31 PROCEDURE — 74176 CT ABD & PELVIS W/O CONTRAST: CPT | Mod: 26

## 2024-09-03 LAB
ALBUMIN SERPL ELPH-MCNC: 4.8 G/DL
ALP BLD-CCNC: 78 U/L
ALT SERPL-CCNC: 32 U/L
ANION GAP SERPL CALC-SCNC: 11 MMOL/L
APPEARANCE: CLEAR
AST SERPL-CCNC: 27 U/L
BACTERIA: NEGATIVE /HPF
BILIRUB SERPL-MCNC: 0.5 MG/DL
BILIRUBIN URINE: NEGATIVE
BLOOD URINE: NEGATIVE
BUN SERPL-MCNC: 13 MG/DL
CALCIUM SERPL-MCNC: 9.3 MG/DL
CAST: 0 /LPF
CHLORIDE SERPL-SCNC: 103 MMOL/L
CO2 SERPL-SCNC: 24 MMOL/L
COLOR: YELLOW
CREAT SERPL-MCNC: 1.36 MG/DL
EGFR: 47 ML/MIN/1.73M2
EPITHELIAL CELLS: 0 /HPF
GLUCOSE QUALITATIVE U: NEGATIVE MG/DL
GLUCOSE SERPL-MCNC: 140 MG/DL
KETONES URINE: NEGATIVE MG/DL
LEUKOCYTE ESTERASE URINE: NEGATIVE
MICROSCOPIC-UA: NORMAL
NITRITE URINE: NEGATIVE
PH URINE: 6.5
POTASSIUM SERPL-SCNC: 4.8 MMOL/L
PROT SERPL-MCNC: 7.5 G/DL
PROTEIN URINE: 30 MG/DL
RED BLOOD CELLS URINE: 1 /HPF
SODIUM SERPL-SCNC: 138 MMOL/L
SPECIFIC GRAVITY URINE: 1.01
UROBILINOGEN URINE: 0.2 MG/DL
WHITE BLOOD CELLS URINE: 0 /HPF

## 2024-09-24 ENCOUNTER — APPOINTMENT (OUTPATIENT)
Dept: INTERNAL MEDICINE | Facility: CLINIC | Age: 54
End: 2024-09-24
Payer: COMMERCIAL

## 2024-09-24 VITALS
RESPIRATION RATE: 14 BRPM | SYSTOLIC BLOOD PRESSURE: 110 MMHG | BODY MASS INDEX: 29.7 KG/M2 | HEART RATE: 74 BPM | WEIGHT: 244 LBS | DIASTOLIC BLOOD PRESSURE: 70 MMHG

## 2024-09-24 DIAGNOSIS — E11.9 TYPE 2 DIABETES MELLITUS W/OUT COMPLICATIONS: ICD-10-CM

## 2024-09-24 DIAGNOSIS — Q61.3 POLYCYSTIC KIDNEY, UNSPECIFIED: ICD-10-CM

## 2024-09-24 DIAGNOSIS — Z00.00 ENCOUNTER FOR GENERAL ADULT MEDICAL EXAMINATION W/OUT ABNORMAL FINDINGS: ICD-10-CM

## 2024-09-24 DIAGNOSIS — E78.5 HYPERLIPIDEMIA, UNSPECIFIED: ICD-10-CM

## 2024-09-24 DIAGNOSIS — I10 ESSENTIAL (PRIMARY) HYPERTENSION: ICD-10-CM

## 2024-09-24 DIAGNOSIS — M54.50 LOW BACK PAIN, UNSPECIFIED: ICD-10-CM

## 2024-09-24 PROCEDURE — 99396 PREV VISIT EST AGE 40-64: CPT | Mod: 25

## 2024-09-24 NOTE — PHYSICAL EXAM
"              After Visit Summary   10/9/2017    Brionna Rollins    MRN: [id number]           Patient Information     Date Of Birth          1945        Visit Information        Provider Department      10/9/2017 11:40 AM Roe Connolly MD [facility]        Today's Diagnoses     Left lower quadrant pain    -  1    Groin pain, left        Left wrist pain        Arthralgia of wrist, left           Follow-ups after your visit        Your next 10 appointments already scheduled     Oct 31, 2017  1:15 PM CDT   Return Visit with Keisha Carrillo MD   [facility] ([facility])    [address]  [address]  [facility] [address]   [phone]              Future tests that were ordered for you today     Open Future Orders        Priority Expected Expires Ordered    CT Abdomen Pelvis w Contrast Routine  10/9/2018 10/9/2017            Who to contact     If you have questions or need follow up information about today's clinic visit or your schedule please contact [facility] directly at [phone].  Normal or non-critical lab and imaging results will be communicated to you by TradeKinghart, letter or phone within 4 business days after the clinic has received the results. If you do not hear from us within 7 days, please contact the clinic through Xigent or phone. If you have a critical or abnormal lab result, we will notify you by phone as soon as possible.  Submit refill requests through Siklu or call your pharmacy and they will forward the refill request to us. Please allow 3 business days for your refill to be completed.          Additional Information About Your Visit        TradeKinghart Information     Siklu lets you send messages to your doctor, view your test results, renew your prescriptions, schedule appointments and more. To sign up, go to [email or web address] . Click on \"Log in\" on the left side of the screen, which will take you to " "the Welcome page. Then click on \"Sign up Now\" on the right side of the page.     You will be asked to enter the access code listed below, as well as some personal information. Please follow the directions to create your username and password.     Your access code is: [email or web address]  Expires: 12/10/2017  3:20 PM     Your access code will  in 90 days. If you need help or a new code, please call your [place] clinic or [phone].        Care EveryWhere ID     This is your Care EveryWhere ID. This could be used by other organizations to access your [place] medical records  [id number]        Your Vitals Were     Pulse Temperature Respirations BMI (Body Mass Index)          68 98.2  F (36.8  C) (Oral) 16 23.82 kg/m2         Blood Pressure from Last 3 Encounters:   10/09/17 126/74   17 146/70   17 130/74    Weight from Last 3 Encounters:   10/09/17 138 lb 12.8 oz (63 kg)   17 135 lb 9.6 oz (61.5 kg)   17 136 lb (61.7 kg)                 Where to get your medicines      Some of these will need a paper prescription and others can be bought over the counter.  Ask your nurse if you have questions.     Bring a paper prescription for each of these medications     acetaminophen-codeine 300-30 MG per tablet          Primary Care Provider Office Phone # Fax #    Roe Connolly [phone]       [facility] [address] [place] [address]        Equal Access to Services     [facility]: Hadii ever nanceo Somelina, waaxda luqadaha, qaybta kaalmada rene, ca ocasio. So [facility] [phone].    ATENCIÓN: Si habla español, tiene a walton disposición servicios gratuitos de asistencia lingüística. Llame al [phone].    We comply with applicable federal civil rights laws and [place] laws. We do not discriminate on the basis of race, color, national origin, age, disability, sex, sexual orientation, or gender identity.            Thank you!     " Thank you for choosing [facility]  for your care. Our goal is always to provide you with excellent care. Hearing back from our patients is one way we can continue to improve our services. Please take a few minutes to complete the written survey that you may receive in the mail after your visit with us. Thank you!             Your Updated Medication List - Protect others around you: Learn how to safely use, store and throw away your medicines at www.disposemymeds.org.          This list is accurate as of: 10/9/17 12:18 PM.  Always use your most recent med list.                   Brand Name Dispense Instructions for use Diagnosis    acetaminophen-codeine 300-30 MG per tablet   Start taking on:  10/17/2017    TYLENOL #3    30 tablet    Take 1-2 tablets by mouth 2 times daily as needed for moderate pain or pain    Left wrist pain, Arthralgia of wrist, left       clotrimazole 1 % solution    LOTRIMIN    60 mL    Apply topically 2 times daily    Intertrigo       cyclobenzaprine 5 MG tablet    FLEXERIL    14 tablet    Take 0.5 tablets (2.5 mg) by mouth nightly as needed for muscle spasms    Chronic left-sided low back pain with left-sided sciatica       diclofenac 1 % Gel topical gel    VOLTAREN    100 g    Apply 1-2 grams to wrist QID as needed .    Left wrist pain, Arthralgia of wrist, left, Arthritis of left wrist       fluticasone 50 MCG/ACT spray    FLONASE    16 g    Spray 1-2 sprays into both nostrils daily    Dysfunction of Eustachian tube, bilateral, Dizziness       hydrocortisone 1 % cream    CORTAID     Apply topically 3 times daily as needed for rash    Rash and nonspecific skin eruption       IBUPROFEN PO      Take 600 mg by mouth every 6 hours as needed for moderate pain        LACTOBACILLUS RHAMNOSUS (GG) PO      Take 2 capsules by mouth 3 times daily (with meals)        MAPAP 325 MG tablet   Generic drug:  acetaminophen      Take 325-650 mg by mouth every 6 hours as needed for mild pain         naproxen 500 MG tablet    NAPROSYN    60 tablet    Take 1 tablet (500 mg) by mouth 2 times daily as needed for moderate pain    Arthritis of carpometacarpal joint       ranitidine 150 MG tablet    ZANTAC    60 tablet    Take 1 tablet (150 mg) by mouth 2 times daily    Gastroesophageal reflux disease without esophagitis          [No Acute Distress] : no acute distress [Well Nourished] : well nourished [Well Developed] : well developed [Well-Appearing] : well-appearing [Normal Sclera/Conjunctiva] : normal sclera/conjunctiva [PERRL] : pupils equal round and reactive to light [EOMI] : extraocular movements intact [Normal Outer Ear/Nose] : the outer ears and nose were normal in appearance [Normal Oropharynx] : the oropharynx was normal [No JVD] : no jugular venous distention [No Lymphadenopathy] : no lymphadenopathy [Supple] : supple [Thyroid Normal, No Nodules] : the thyroid was normal and there were no nodules present [No Respiratory Distress] : no respiratory distress  [No Accessory Muscle Use] : no accessory muscle use [Clear to Auscultation] : lungs were clear to auscultation bilaterally [Normal Rate] : normal rate  [Regular Rhythm] : with a regular rhythm [Normal S1, S2] : normal S1 and S2 [No Murmur] : no murmur heard [No Carotid Bruits] : no carotid bruits [No Abdominal Bruit] : a ~M bruit was not heard ~T in the abdomen [No Varicosities] : no varicosities [Pedal Pulses Present] : the pedal pulses are present [No Edema] : there was no peripheral edema [No Palpable Aorta] : no palpable aorta [No Extremity Clubbing/Cyanosis] : no extremity clubbing/cyanosis [Soft] : abdomen soft [Non Tender] : non-tender [Non-distended] : non-distended [No Masses] : no abdominal mass palpated [No HSM] : no HSM [Normal Bowel Sounds] : normal bowel sounds [Normal Posterior Cervical Nodes] : no posterior cervical lymphadenopathy [Normal Anterior Cervical Nodes] : no anterior cervical lymphadenopathy [No CVA Tenderness] : no CVA  tenderness [No Spinal Tenderness] : no spinal tenderness [No Joint Swelling] : no joint swelling [Grossly Normal Strength/Tone] : grossly normal strength/tone [No Rash] : no rash [Coordination Grossly Intact] : coordination grossly intact [No Focal Deficits] : no focal deficits [Normal Gait] : normal gait [Deep Tendon Reflexes (DTR)] : deep tendon reflexes were 2+ and symmetric [Normal Affect] : the affect was normal [Normal Insight/Judgement] : insight and judgment were intact [de-identified] : [id number] floward flexion

## 2024-09-24 NOTE — PHYSICAL EXAM
[No Acute Distress] : no acute distress [Well Nourished] : well nourished [Well Developed] : well developed [Well-Appearing] : well-appearing [Normal Sclera/Conjunctiva] : normal sclera/conjunctiva [PERRL] : pupils equal round and reactive to light [EOMI] : extraocular movements intact [Normal Outer Ear/Nose] : the outer ears and nose were normal in appearance [Normal Oropharynx] : the oropharynx was normal [No JVD] : no jugular venous distention [No Lymphadenopathy] : no lymphadenopathy [Supple] : supple [Thyroid Normal, No Nodules] : the thyroid was normal and there were no nodules present [No Respiratory Distress] : no respiratory distress  [No Accessory Muscle Use] : no accessory muscle use [Clear to Auscultation] : lungs were clear to auscultation bilaterally [Normal Rate] : normal rate  [Regular Rhythm] : with a regular rhythm [Normal S1, S2] : normal S1 and S2 [No Murmur] : no murmur heard [No Carotid Bruits] : no carotid bruits [No Abdominal Bruit] : a ~M bruit was not heard ~T in the abdomen [No Varicosities] : no varicosities [Pedal Pulses Present] : the pedal pulses are present [No Edema] : there was no peripheral edema [No Palpable Aorta] : no palpable aorta [No Extremity Clubbing/Cyanosis] : no extremity clubbing/cyanosis [Soft] : abdomen soft [Non Tender] : non-tender [Non-distended] : non-distended [No Masses] : no abdominal mass palpated [No HSM] : no HSM [Normal Bowel Sounds] : normal bowel sounds [Normal Posterior Cervical Nodes] : no posterior cervical lymphadenopathy [Normal Anterior Cervical Nodes] : no anterior cervical lymphadenopathy [No CVA Tenderness] : no CVA  tenderness [No Spinal Tenderness] : no spinal tenderness [No Joint Swelling] : no joint swelling [Grossly Normal Strength/Tone] : grossly normal strength/tone [No Rash] : no rash [Coordination Grossly Intact] : coordination grossly intact [No Focal Deficits] : no focal deficits [Normal Gait] : normal gait [Deep Tendon Reflexes (DTR)] : deep tendon reflexes were 2+ and symmetric [Normal Affect] : the affect was normal [Normal Insight/Judgement] : insight and judgment were intact [de-identified] : [id number] floward flexion

## 2024-09-24 NOTE — HEALTH RISK ASSESSMENT
[Excellent] : ~his/her~  mood as  excellent [No] : In the past 12 months have you used drugs other than those required for medical reasons? No [No falls in past year] : Patient reported no falls in the past year [0] : 2) Feeling down, depressed, or hopeless: Not at all (0) [PHQ-2 Negative - No further assessment needed] : PHQ-2 Negative - No further assessment needed [Never] : Never [None] : None [With Family] : lives with family [Employed] : employed [] :  [Feels Safe at Home] : Feels safe at home [Fully functional (bathing, dressing, toileting, transferring, walking, feeding)] : Fully functional (bathing, dressing, toileting, transferring, walking, feeding) [Fully functional (using the telephone, shopping, preparing meals, housekeeping, doing laundry, using] : Fully functional and needs no help or supervision to perform IADLs (using the telephone, shopping, preparing meals, housekeeping, doing laundry, using transportation, managing medications and managing finances) [Reports normal functional visual acuity (ie: able to read med bottle)] : Reports normal functional visual acuity [Smoke Detector] : smoke detector [Seat Belt] :  uses seat belt [Audit-CScore] : 0 [[id number]] : 0 [Change in mental status noted] : No change in mental status noted [Language] : denies difficulty with language [Behavior] : denies difficulty with behavior [Learning/Retaining New Information] : denies difficulty learning/retaining new information [Handling Complex Tasks] : denies difficulty handling complex tasks [Reasoning] : denies difficulty with reasoning [Spatial Ability and Orientation] : denies difficulty with spatial ability and orientation [Reports changes in hearing] : Reports no changes in hearing [Reports changes in vision] : Reports no changes in vision [Reports changes in dental health] : Reports no changes in dental health

## 2024-09-24 NOTE — HEALTH RISK ASSESSMENT
[Excellent] : ~his/her~  mood as  excellent [No] : In the past 12 months have you used drugs other than those required for medical reasons? No [No falls in past year] : Patient reported no falls in the past year [0] : 2) Feeling down, depressed, or hopeless: Not at all (0) [PHQ-2 Negative - No further assessment needed] : PHQ-2 Negative - No further assessment needed [Never] : Never [None] : None [With Family] : lives with family [Employed] : employed [] :  [Feels Safe at Home] : Feels safe at home [Fully functional (bathing, dressing, toileting, transferring, walking, feeding)] : Fully functional (bathing, dressing, toileting, transferring, walking, feeding) [Fully functional (using the telephone, shopping, preparing meals, housekeeping, doing laundry, using] : Fully functional and needs no help or supervision to perform IADLs (using the telephone, shopping, preparing meals, housekeeping, doing laundry, using transportation, managing medications and managing finances) [Reports normal functional visual acuity (ie: able to read med bottle)] : Reports normal functional visual acuity [Smoke Detector] : smoke detector [Seat Belt] :  uses seat belt [Audit-CScore] : 0 [[id number]] : 0 [Change in mental status noted] : No change in mental status noted [Language] : denies difficulty with language [Behavior] : denies difficulty with behavior [Learning/Retaining New Information] : denies difficulty learning/retaining new information [Handling Complex Tasks] : denies difficulty handling complex tasks [Reasoning] : denies difficulty with reasoning [Spatial Ability and Orientation] : denies difficulty with spatial ability and orientation [Reports changes in hearing] : Reports no changes in hearing [Reports changes in vision] : Reports no changes in vision [Reports changes in dental health] : Reports no changes in dental health

## 2024-09-24 NOTE — HISTORY OF PRESENT ILLNESS
[FreeTextEntry1] : Ms. NG is here for an annual physical examination and assessment. [de-identified] : She generally feels well with no specific complaints. Her recent health has been good. Still with left flank pain.   Denies headache, dizziness. Denies rash, fatigue, fever, weight loss, anorexia. Denies cough, wheezing. Denies CP, SOB, CAMPOS, edema, palpitations. Denies abdominal pain, N/V/D/C. Denies BRBPR, melena, dysphagia. Denies dysuria, frequency, hematuria, hesitancy. Denies weakness, numbness, gait instability.

## 2024-09-25 ENCOUNTER — APPOINTMENT (OUTPATIENT)
Dept: INTERNAL MEDICINE | Facility: CLINIC | Age: 54
End: 2024-09-25

## 2024-09-25 LAB
ALBUMIN SERPL ELPH-MCNC: 4.6 G/DL
ALP BLD-CCNC: 76 U/L
ALT SERPL-CCNC: 40 U/L
ANION GAP SERPL CALC-SCNC: 17 MMOL/L
AST SERPL-CCNC: 27 U/L
BILIRUB SERPL-MCNC: 0.5 MG/DL
BUN SERPL-MCNC: 12 MG/DL
CALCIUM SERPL-MCNC: 9.1 MG/DL
CHLORIDE SERPL-SCNC: 100 MMOL/L
CHOLEST SERPL-MCNC: 206 MG/DL
CO2 SERPL-SCNC: 20 MMOL/L
CREAT SERPL-MCNC: 1.27 MG/DL
CREAT SPEC-SCNC: 198 MG/DL
EGFR: 51 ML/MIN/1.73M2
ESTIMATED AVERAGE GLUCOSE: 186 MG/DL
GLUCOSE SERPL-MCNC: 168 MG/DL
HBA1C MFR BLD HPLC: 8.1 %
HCT VFR BLD CALC: 43.1 %
HDLC SERPL-MCNC: 47 MG/DL
HGB BLD-MCNC: 14 G/DL
LDLC SERPL CALC-MCNC: 125 MG/DL
MCHC RBC-ENTMCNC: 27.7 PG
MCHC RBC-ENTMCNC: 32.5 GM/DL
MCV RBC AUTO: 85.3 FL
MICROALBUMIN 24H UR DL<=1MG/L-MCNC: 84.7 MG/DL
MICROALBUMIN/CREAT 24H UR-RTO: 428 MG/G
NONHDLC SERPL-MCNC: 159 MG/DL
PLATELET # BLD AUTO: 211 K/UL
POTASSIUM SERPL-SCNC: 4.6 MMOL/L
PROT SERPL-MCNC: 7.4 G/DL
PSA SERPL-MCNC: 1.11 NG/ML
RBC # BLD: 5.05 M/UL
RBC # FLD: 12.3 %
SODIUM SERPL-SCNC: 137 MMOL/L
TRIGL SERPL-MCNC: 194 MG/DL
WBC # FLD AUTO: 4.92 K/UL
